# Patient Record
Sex: FEMALE | Race: BLACK OR AFRICAN AMERICAN | NOT HISPANIC OR LATINO | Employment: UNEMPLOYED | URBAN - METROPOLITAN AREA
[De-identification: names, ages, dates, MRNs, and addresses within clinical notes are randomized per-mention and may not be internally consistent; named-entity substitution may affect disease eponyms.]

---

## 2017-07-12 ENCOUNTER — HOSPITAL ENCOUNTER (INPATIENT)
Facility: HOSPITAL | Age: 47
LOS: 1 days | Discharge: HOME | DRG: 812 | End: 2017-07-13
Attending: OBSTETRICS & GYNECOLOGY | Admitting: OBSTETRICS & GYNECOLOGY
Payer: MEDICAID

## 2017-07-12 DIAGNOSIS — D25.9 UTERINE LEIOMYOMA, UNSPECIFIED LOCATION: ICD-10-CM

## 2017-07-12 DIAGNOSIS — N92.4 PERIMENOPAUSAL MENORRHAGIA: ICD-10-CM

## 2017-07-12 DIAGNOSIS — N93.8 DYSFUNCTIONAL UTERINE BLEEDING: ICD-10-CM

## 2017-07-12 DIAGNOSIS — R10.2 ACUTE PELVIC PAIN, FEMALE: ICD-10-CM

## 2017-07-12 DIAGNOSIS — D64.9 SYMPTOMATIC ANEMIA: Primary | ICD-10-CM

## 2017-07-12 PROBLEM — D25.1 INTRAMURAL LEIOMYOMA OF UTERUS: Status: ACTIVE | Noted: 2017-07-12

## 2017-07-12 LAB
ABO + RH BLD: NORMAL
ANION GAP SERPL CALC-SCNC: 9 MMOL/L
ANISOCYTOSIS BLD QL SMEAR: SLIGHT
B-HCG UR QL: NEGATIVE
BASOPHILS # BLD AUTO: 0.07 K/UL
BASOPHILS NFR BLD: 1 %
BLD GP AB SCN CELLS X3 SERPL QL: NORMAL
BLD PROD TYP BPU: NORMAL
BLD PROD TYP BPU: NORMAL
BLOOD UNIT EXPIRATION DATE: NORMAL
BLOOD UNIT EXPIRATION DATE: NORMAL
BLOOD UNIT TYPE CODE: 5100
BLOOD UNIT TYPE CODE: 5100
BLOOD UNIT TYPE: NORMAL
BLOOD UNIT TYPE: NORMAL
BUN SERPL-MCNC: 5 MG/DL
CALCIUM SERPL-MCNC: 8.7 MG/DL
CHLORIDE SERPL-SCNC: 109 MMOL/L
CO2 SERPL-SCNC: 22 MMOL/L
CODING SYSTEM: NORMAL
CODING SYSTEM: NORMAL
CREAT SERPL-MCNC: 0.8 MG/DL
DACRYOCYTES BLD QL SMEAR: ABNORMAL
DIFFERENTIAL METHOD: ABNORMAL
DISPENSE STATUS: NORMAL
DISPENSE STATUS: NORMAL
EOSINOPHIL # BLD AUTO: 0.4 K/UL
EOSINOPHIL NFR BLD: 5.7 %
ERYTHROCYTE [DISTWIDTH] IN BLOOD BY AUTOMATED COUNT: 20.6 %
EST. GFR  (AFRICAN AMERICAN): >60 ML/MIN/1.73 M^2
EST. GFR  (NON AFRICAN AMERICAN): >60 ML/MIN/1.73 M^2
GLUCOSE SERPL-MCNC: 89 MG/DL
HCT VFR BLD AUTO: 26.8 %
HGB BLD-MCNC: 7.1 G/DL
HYPOCHROMIA BLD QL SMEAR: ABNORMAL
LYMPHOCYTES # BLD AUTO: 2.1 K/UL
LYMPHOCYTES NFR BLD: 29.4 %
MCH RBC QN AUTO: 16.1 PG
MCHC RBC AUTO-ENTMCNC: 26.5 %
MCV RBC AUTO: 61 FL
MONOCYTES # BLD AUTO: 0.7 K/UL
MONOCYTES NFR BLD: 10.1 %
NEUTROPHILS # BLD AUTO: 3.8 K/UL
NEUTROPHILS NFR BLD: 54.1 %
NUM UNITS TRANS PACKED RBC: NORMAL
NUM UNITS TRANS PACKED RBC: NORMAL
OVALOCYTES BLD QL SMEAR: ABNORMAL
PLATELET # BLD AUTO: 320 K/UL
PLATELET BLD QL SMEAR: ABNORMAL
PMV BLD AUTO: 9.1 FL
POIKILOCYTOSIS BLD QL SMEAR: SLIGHT
POLYCHROMASIA BLD QL SMEAR: ABNORMAL
POTASSIUM SERPL-SCNC: 4 MMOL/L
RBC # BLD AUTO: 4.41 M/UL
SODIUM SERPL-SCNC: 140 MMOL/L
SPHEROCYTES BLD QL SMEAR: ABNORMAL
STOMATOCYTES BLD QL SMEAR: PRESENT
TARGETS BLD QL SMEAR: ABNORMAL
WBC # BLD AUTO: 7.03 K/UL

## 2017-07-12 PROCEDURE — 11000001 HC ACUTE MED/SURG PRIVATE ROOM

## 2017-07-12 PROCEDURE — 25000003 PHARM REV CODE 250: Performed by: PHYSICIAN ASSISTANT

## 2017-07-12 PROCEDURE — 86920 COMPATIBILITY TEST SPIN: CPT

## 2017-07-12 PROCEDURE — 25000003 PHARM REV CODE 250: Performed by: OBSTETRICS & GYNECOLOGY

## 2017-07-12 PROCEDURE — 86901 BLOOD TYPING SEROLOGIC RH(D): CPT

## 2017-07-12 PROCEDURE — 96361 HYDRATE IV INFUSION ADD-ON: CPT

## 2017-07-12 PROCEDURE — 81025 URINE PREGNANCY TEST: CPT

## 2017-07-12 PROCEDURE — 63600175 PHARM REV CODE 636 W HCPCS: Performed by: PHYSICIAN ASSISTANT

## 2017-07-12 PROCEDURE — 99285 EMERGENCY DEPT VISIT HI MDM: CPT | Mod: 25

## 2017-07-12 PROCEDURE — 86900 BLOOD TYPING SEROLOGIC ABO: CPT

## 2017-07-12 PROCEDURE — 80048 BASIC METABOLIC PNL TOTAL CA: CPT

## 2017-07-12 PROCEDURE — 36430 TRANSFUSION BLD/BLD COMPNT: CPT

## 2017-07-12 PROCEDURE — 96375 TX/PRO/DX INJ NEW DRUG ADDON: CPT

## 2017-07-12 PROCEDURE — 36415 COLL VENOUS BLD VENIPUNCTURE: CPT

## 2017-07-12 PROCEDURE — 63600175 PHARM REV CODE 636 W HCPCS: Performed by: OBSTETRICS & GYNECOLOGY

## 2017-07-12 PROCEDURE — P9016 RBC LEUKOCYTES REDUCED: HCPCS

## 2017-07-12 PROCEDURE — 99222 1ST HOSP IP/OBS MODERATE 55: CPT | Mod: ,,, | Performed by: OBSTETRICS & GYNECOLOGY

## 2017-07-12 PROCEDURE — 93010 ELECTROCARDIOGRAM REPORT: CPT | Mod: ,,, | Performed by: INTERNAL MEDICINE

## 2017-07-12 PROCEDURE — 96374 THER/PROPH/DIAG INJ IV PUSH: CPT

## 2017-07-12 PROCEDURE — 85025 COMPLETE CBC W/AUTO DIFF WBC: CPT

## 2017-07-12 RX ORDER — HYDROCODONE BITARTRATE AND ACETAMINOPHEN 500; 5 MG/1; MG/1
TABLET ORAL
Status: DISCONTINUED | OUTPATIENT
Start: 2017-07-12 | End: 2017-07-13 | Stop reason: HOSPADM

## 2017-07-12 RX ORDER — DIPHENHYDRAMINE HYDROCHLORIDE 50 MG/ML
25 INJECTION INTRAMUSCULAR; INTRAVENOUS ONCE
Status: COMPLETED | OUTPATIENT
Start: 2017-07-12 | End: 2017-07-12

## 2017-07-12 RX ORDER — MEPERIDINE HYDROCHLORIDE 50 MG/ML
12.5 INJECTION INTRAMUSCULAR; INTRAVENOUS; SUBCUTANEOUS
Status: COMPLETED | OUTPATIENT
Start: 2017-07-12 | End: 2017-07-12

## 2017-07-12 RX ORDER — HYDROCODONE BITARTRATE AND ACETAMINOPHEN 10; 325 MG/1; MG/1
1 TABLET ORAL EVERY 4 HOURS PRN
Status: DISCONTINUED | OUTPATIENT
Start: 2017-07-12 | End: 2017-07-13 | Stop reason: HOSPADM

## 2017-07-12 RX ORDER — ONDANSETRON 2 MG/ML
4 INJECTION INTRAMUSCULAR; INTRAVENOUS EVERY 12 HOURS PRN
Status: DISCONTINUED | OUTPATIENT
Start: 2017-07-12 | End: 2017-07-13 | Stop reason: HOSPADM

## 2017-07-12 RX ORDER — DIPHENHYDRAMINE HYDROCHLORIDE 50 MG/ML
25 INJECTION INTRAMUSCULAR; INTRAVENOUS
Status: COMPLETED | OUTPATIENT
Start: 2017-07-12 | End: 2017-07-12

## 2017-07-12 RX ORDER — NORETHINDRONE 5 MG/1
10 TABLET ORAL DAILY
Status: DISCONTINUED | OUTPATIENT
Start: 2017-07-12 | End: 2017-07-13 | Stop reason: HOSPADM

## 2017-07-12 RX ADMIN — DIPHENHYDRAMINE HYDROCHLORIDE 25 MG: 50 INJECTION, SOLUTION INTRAMUSCULAR; INTRAVENOUS at 05:07

## 2017-07-12 RX ADMIN — HYDROCODONE BITARTRATE AND ACETAMINOPHEN 1 TABLET: 10; 325 TABLET ORAL at 07:07

## 2017-07-12 RX ADMIN — NORETHINDRONE ACETATE 10 MG: 5 TABLET ORAL at 07:07

## 2017-07-12 RX ADMIN — DIPHENHYDRAMINE HYDROCHLORIDE 25 MG: 50 INJECTION, SOLUTION INTRAMUSCULAR; INTRAVENOUS at 09:07

## 2017-07-12 RX ADMIN — SODIUM CHLORIDE 1000 ML: 0.9 INJECTION, SOLUTION INTRAVENOUS at 12:07

## 2017-07-12 RX ADMIN — MEPERIDINE HYDROCHLORIDE 12.5 MG: 50 INJECTION INTRAMUSCULAR; INTRAVENOUS; SUBCUTANEOUS at 03:07

## 2017-07-12 NOTE — HPI
Pt reports several years of irregular menses.  Feels every other month she has 2 menses a month, flow at least 7 days; reports use of super pad; has to double up and change q 1-2 hrs.  Presented to er this am because she was light headed and had pelvic pain.  hgb 7.1; pelvic sono--ut 11.8x4.6x5.3; with 2 cm fundal fibroid; lining 1.3mm wnl; r/l ov wnl

## 2017-07-12 NOTE — ED PROVIDER NOTES
Encounter Date: 7/12/2017    SCRIBE #1 NOTE: I, Jolly Araceli, am scribing for, and in the presence of, SAKINA Mae.       History     Chief Complaint   Patient presents with    Female  Problem     patient having vaginal bleeding and she feeling dizziness     The patient presents to the ER for an emergent evaluation due to acute pelvic pain, heavy vaginal bleeding, and lightheadedness. She states that her symptoms began yesterday. She states that the degree is moderate to severe. She states that the course is waxing and waning. She states that she has used 3 pads so far today, no tampons. She states that she has been having 2 periods each month for several months, and the bleeding has been lasting up to 10 days. She denies any additional symptoms.                 Review of patient's allergies indicates:   Allergen Reactions    Morphine Itching     Past Medical History:   Diagnosis Date    Anemia     Heart murmur     History of blood transfusion     Mitral valve prolapse     Ovarian torsion     Rupture of ovary or fallopian tube      Past Surgical History:   Procedure Laterality Date    APPENDECTOMY      Left ovary and follopian tube removal        No family history on file.  Social History   Substance Use Topics    Smoking status: Current Every Day Smoker     Packs/day: 0.50     Types: Cigarettes    Smokeless tobacco: Never Used    Alcohol use No     Review of Systems   Constitutional: Negative for activity change, appetite change, chills, diaphoresis and fever.   Eyes: Negative for visual disturbance.   Respiratory: Negative for cough, chest tightness and shortness of breath.    Cardiovascular: Negative for chest pain and palpitations.   Gastrointestinal: Negative for abdominal distention, abdominal pain, blood in stool, diarrhea, nausea and vomiting.   Genitourinary: Positive for menstrual problem, pelvic pain and vaginal bleeding. Negative for dysuria, flank pain and frequency.    Musculoskeletal: Negative for gait problem.   Skin: Negative for color change.   Neurological: Positive for dizziness and light-headedness. Negative for seizures, syncope, facial asymmetry, speech difficulty, weakness, numbness and headaches.   Hematological: Does not bruise/bleed easily.   Psychiatric/Behavioral: Negative for confusion.       Physical Exam     Initial Vitals [07/12/17 1141]   BP Pulse Resp Temp SpO2   121/74 64 20 98.2 °F (36.8 °C) 100 %      MAP       89.67         Physical Exam    Nursing note and vitals reviewed.  Constitutional: She appears well-developed and well-nourished. She is not diaphoretic. She appears distressed.   HENT:   Head: Normocephalic.   Mouth/Throat: Oropharynx is clear and moist.   Eyes: Conjunctivae are normal.   Cardiovascular: Normal rate, regular rhythm and intact distal pulses.   Murmur heard.  Pulmonary/Chest: Breath sounds normal. No respiratory distress.   Abdominal: Soft. She exhibits no distension. There is no rebound and no guarding.   Mild to moderate diffuse tenderness to palpation of lower abdomen/pelvis.   Musculoskeletal: Normal range of motion.   Neurological: She is alert and oriented to person, place, and time. She has normal strength. No cranial nerve deficit or sensory deficit.   Skin: Skin is warm and dry. No rash noted.   Psychiatric: She has a normal mood and affect. Her behavior is normal.         ED Course   Procedures  Labs Reviewed   CBC W/ AUTO DIFFERENTIAL - Abnormal; Notable for the following:        Result Value    Hemoglobin 7.1 (*)     Hematocrit 26.8 (*)     MCV 61 (*)     MCH 16.1 (*)     MCHC 26.5 (*)     RDW 20.6 (*)     MPV 9.1 (*)     All other components within normal limits   BASIC METABOLIC PANEL - Abnormal; Notable for the following:     CO2 22 (*)     BUN, Bld 5 (*)     All other components within normal limits   PREGNANCY TEST, URINE RAPID   BASIC METABOLIC PANEL   TYPE & SCREEN   PREPARE RBC SOFT     Results for orders placed  or performed during the hospital encounter of 07/12/17   Pregnancy, urine rapid   Result Value Ref Range    Preg Test, Ur Negative    CBC auto differential   Result Value Ref Range    WBC 7.03 3.90 - 12.70 K/uL    RBC 4.41 4.00 - 5.40 M/uL    Hemoglobin 7.1 (L) 12.0 - 16.0 g/dL    Hematocrit 26.8 (L) 37.0 - 48.5 %    MCV 61 (L) 82 - 98 fL    MCH 16.1 (L) 27.0 - 31.0 pg    MCHC 26.5 (L) 32.0 - 36.0 %    RDW 20.6 (H) 11.5 - 14.5 %    Platelets 320 150 - 350 K/uL    MPV 9.1 (L) 9.2 - 12.9 fL    Gran # 3.8 1.8 - 7.7 K/uL    Lymph # 2.1 1.0 - 4.8 K/uL    Mono # 0.7 0.3 - 1.0 K/uL    Eos # 0.4 0.0 - 0.5 K/uL    Baso # 0.07 0.00 - 0.20 K/uL    Gran% 54.1 38.0 - 73.0 %    Lymph% 29.4 18.0 - 48.0 %    Mono% 10.1 4.0 - 15.0 %    Eosinophil% 5.7 0.0 - 8.0 %    Basophil% 1.0 0.0 - 1.9 %    Platelet Estimate Appears normal     Aniso Slight     Poik Slight     Poly Occasional     Hypo Moderate     Ovalocytes Occasional     Target Cells Occasional     Tear Drop Cells Occasional     Stomatocytes Present     Spherocytes Occasional     Differential Method Automated    Basic metabolic panel   Result Value Ref Range    Sodium 140 136 - 145 mmol/L    Potassium 4.0 3.5 - 5.1 mmol/L    Chloride 109 95 - 110 mmol/L    CO2 22 (L) 23 - 29 mmol/L    Glucose 89 70 - 110 mg/dL    BUN, Bld 5 (L) 6 - 20 mg/dL    Creatinine 0.8 0.5 - 1.4 mg/dL    Calcium 8.7 8.7 - 10.5 mg/dL    Anion Gap 9 8 - 16 mmol/L    eGFR if African American >60 >60 mL/min/1.73 m^2    eGFR if non African American >60 >60 mL/min/1.73 m^2   Type & Screen   Result Value Ref Range    Group & Rh O POS     Indirect Laron NEG      Vitals:    07/12/17 1155 07/12/17 1200 07/12/17 1205 07/12/17 1344   BP: (!) 140/81 (!) 140/94 124/83 137/79   BP Location: Right arm Right arm Right arm    Patient Position: Lying Sitting Standing    BP Method: Automatic Automatic Automatic    Pulse: 66 77 93 (!) 58   Resp:    18   Temp:       TempSrc:       SpO2:    99%   Weight:       Height:          Imaging Results          US Pelvis Comp with Transvag NON-OB (xpd) (Final result)  Result time 07/12/17 15:53:52   Procedure changed from US Pelvis Complete Non OB     Final result by Jeff Stuart MD (07/12/17 15:53:52)                 Impression:     Fundal small uterine fibroid, but otherwise unremarkable pelvic ultrasound.      Electronically signed by: JEFF STUART MD  Date:     07/12/17  Time:    15:53              Narrative:    Exam: Complete Pelvic Ultrasound with Transvaginal non-OB    Clinical History:    Acute pelvic pain and heavy vaginal bleeding.    Findings:     The uterus is normal in size measuring 11.8 x 4.6 x 5.3 cm and has a normal echotexture with a normal 1.6 mm endometrial stripe.  There is a 2.2 cm fibroid at the fundus of the uterus.  The right ovary measures 2.6 x 1.6 x 2.1 cm and has a normal appearance.  The left ovary has been removed surgically. No free fluid identified. No solid adnexal lesions identified. Internal flow is demonstrated within the right ovary on color doppler.                              4:15 PM: Discussed case with Dr. Piper (OB/GYN). Dr. Piper agrees with current care and management of pt and accepts admission. She requests STAT blood transfusion of 2 units   Admitting Service: OB/GYN  Admitting Physician: Dr. Piper  Admit to: Med/Tele    4:15 PM: Re-evaluated pt. I have discussed test results, shared treatment plan, and the need for admission with patient and family at bedside. Pt and family express understanding at this time and agree with all information. All questions answered. Pt and family have no further questions or concerns at this time. Pt is ready for admit.              Medical Decision Making:   History:   Old Medical Records: I decided to obtain old medical records.  Initial Assessment:   Pt here due to acute pelvic pain, heavy abnormal periods, and lightheadedness.     Hx of ovarian torsion/fallopian rupture, and anemia requiring transfusion in the  past.   Differential Diagnosis:   Anemia, Dysfunctional uterine bleeding, Menorrhagia, Metrorrhagia, Dysmenorrhea, Ovarian cyst, Uterine fibroid, Ovarian torsion, Ectopic pregnancy, TOA, etc   Clinical Tests:   Lab Tests: Ordered and Reviewed  Radiological Study: Ordered and Reviewed  ED Management:  H & H compared to previous.   US shows fibroid   Discussed case in detail with on call OB/GYN, Dr Piper, who requests transfusing 2 units of blood now and admitting to her service.   Other:   I have discussed this case with another health care provider.            Scribe Attestation:   Scribe #1: I performed the above scribed service and the documentation accurately describes the services I performed. I attest to the accuracy of the note.    Attending Attestation:           Physician Attestation for Scribe:  Physician Attestation Statement for Scribe #1: I, SAKINA Mae, reviewed documentation, as scribed by Jolly Charles in my presence, and it is both accurate and complete.                 ED Course     Clinical Impression:   The primary encounter diagnosis was Symptomatic anemia. Diagnoses of Dysfunctional uterine bleeding, Acute pelvic pain, female, and Uterine leiomyoma, unspecified location were also pertinent to this visit.    Disposition:   Disposition: Admitted  Condition: Fair                        Rickie Campa PA-C  07/12/17 1623

## 2017-07-12 NOTE — ASSESSMENT & PLAN NOTE
Reviewed options--provera, depo provera, mirena iud, ablation; hysterectomy  Pt elects trial of provera for now  Agrees to f/u with me in 2 wks  Or may need [facility] due to insurance restraints

## 2017-07-12 NOTE — ED NOTES
Pt c/o generalized itching, SAKINA Saleem notified, verbal order received, continue blood transfusion.

## 2017-07-12 NOTE — SUBJECTIVE & OBJECTIVE
Pt reports she is feeling a little better; still bleeding; denies chest pain, shortness of breath    Obstetric History     No data available        Past Medical History:   Diagnosis Date    Anemia     Heart murmur     History of blood transfusion     Mitral valve prolapse     Ovarian torsion     Rupture of ovary or fallopian tube      Past Surgical History:   Procedure Laterality Date    APPENDECTOMY      Left ovary and follopian tube removal      c/section      (Not in a hospital admission)    Review of patient's allergies indicates:   Allergen Reactions    Morphine Itching        Family History     None        Social History Main Topics    Smoking status: Current Every Day Smoker     Packs/day: 0.50     Types: Cigarettes    Smokeless tobacco: Never Used    Alcohol use No    Drug use:      Types: Marijuana    Sexual activity: Not on file     Review of Systems   Constitutional: Negative for activity change, appetite change, chills, diaphoresis, fatigue, fever and unexpected weight change.   HENT: Negative for mouth sores and tinnitus.    Eyes: Negative for discharge and visual disturbance.   Respiratory: Negative for cough, shortness of breath and wheezing.    Cardiovascular: Negative for chest pain, palpitations and leg swelling.   Gastrointestinal: Negative for abdominal pain, bloating, blood in stool, constipation, diarrhea, nausea and vomiting.   Endocrine: Negative for diabetes, hair loss, hot flashes, hyperthyroidism and hypothyroidism.   Genitourinary: Positive for menorrhagia, menstrual problem and pelvic pain. Negative for decreased libido, dyspareunia, dysuria, flank pain, frequency, genital sores, hematuria, urgency, vaginal bleeding, vaginal discharge, vaginal pain, dysmenorrhea, urinary incontinence, postcoital bleeding, postmenopausal bleeding and vaginal odor.   Musculoskeletal: Negative for back pain and myalgias.   Skin:  Negative for rash, no acne and hair changes.   Neurological:  Positive for syncope. Negative for seizures, numbness and headaches.   Hematological: Negative for adenopathy. Does not bruise/bleed easily.   Psychiatric/Behavioral: Negative for depression and sleep disturbance. The patient is not nervous/anxious.    Breast: Negative for breast mass, breast pain, nipple discharge and skin changes     Objective:     Vital Signs (Most Recent):  Temp: 98.3 °F (36.8 °C) (07/12/17 1836)  Pulse: (!) 59 (07/12/17 1836)  Resp: 16 (07/12/17 1836)  BP: 126/73 (07/12/17 1836)  SpO2: 100 % (07/12/17 1836) Vital Signs (24h Range):  Temp:  [98.2 °F (36.8 °C)-98.7 °F (37.1 °C)] 98.3 °F (36.8 °C)  Pulse:  [58-93] 59  Resp:  [14-20] 16  SpO2:  [99 %-100 %] 100 %  BP: (113-140)/(49-94) 126/73     Weight: 72.6 kg (160 lb)  Body mass index is 28.34 kg/m².    No LMP recorded.    Physical Exam:   Constitutional: She appears well-developed.     Eyes: Conjunctivae and EOM are normal. Pupils are equal, round, and reactive to light.    Neck: Normal range of motion. Neck supple.     Pulmonary/Chest: Effort normal.        Abdominal: Soft.             Musculoskeletal: Normal range of motion.       Neurological: She is alert.    Skin: Skin is warm.    Psychiatric: She has a normal mood and affect.       Laboratory:  CBC:   Recent Labs  Lab 07/12/17  1247   WBC 7.03   RBC 4.41   HGB 7.1*   HCT 26.8*      MCV 61*   MCH 16.1*   MCHC 26.5*       Diagnostic Results:  US: Reviewed  ut 11.8x4.6x5.3, with 2.1 cm fundal fibroid, endometrial lining wnl

## 2017-07-12 NOTE — H&P
[facility] -   Obstetrics & Gynecology  History & Physical    Patient Name: Lani Hurst  MRN: [id number]  Admission Date: 7/12/2017  Primary Care Provider: No primary care provider on file.    Subjective:     Chief Complaint/Reason for Admission: anemia    History of Present Illness:  Pt reports several years of irregular menses.  Feels every other month she has 2 menses a month, flow at least 7 days; reports use of super pad; has to double up and change q 1-2 hrs.  Presented to er this am because she was light headed and had pelvic pain.  hgb 7.1; pelvic sono--ut 11.8x4.6x5.3; with 2 cm fundal fibroid; lining 1.3mm wnl; r/l ov wnl    Pt reports she is feeling a little better; still bleeding; denies chest pain, shortness of breath    Obstetric History     No data available        Past Medical History:   Diagnosis Date    Anemia     Heart murmur     History of blood transfusion     Mitral valve prolapse     Ovarian torsion     Rupture of ovary or fallopian tube      Past Surgical History:   Procedure Laterality Date    APPENDECTOMY      Left ovary and follopian tube removal      c/section      (Not in a hospital admission)    Review of patient's allergies indicates:   Allergen Reactions    Morphine Itching        Family History     None        Social History Main Topics    Smoking status: Current Every Day Smoker     Packs/day: 0.50     Types: Cigarettes    Smokeless tobacco: Never Used    Alcohol use No    Drug use:      Types: Marijuana    Sexual activity: Not on file     Review of Systems   Constitutional: Negative for activity change, appetite change, chills, diaphoresis, fatigue, fever and unexpected weight change.   HENT: Negative for mouth sores and tinnitus.    Eyes: Negative for discharge and visual disturbance.   Respiratory: Negative for cough, shortness of breath and wheezing.    Cardiovascular: Negative for chest pain, palpitations and leg swelling.   Gastrointestinal:  Negative for abdominal pain, bloating, blood in stool, constipation, diarrhea, nausea and vomiting.   Endocrine: Negative for diabetes, hair loss, hot flashes, hyperthyroidism and hypothyroidism.   Genitourinary: Positive for menorrhagia, menstrual problem and pelvic pain. Negative for decreased libido, dyspareunia, dysuria, flank pain, frequency, genital sores, hematuria, urgency, vaginal bleeding, vaginal discharge, vaginal pain, dysmenorrhea, urinary incontinence, postcoital bleeding, postmenopausal bleeding and vaginal odor.   Musculoskeletal: Negative for back pain and myalgias.   Skin:  Negative for rash, no acne and hair changes.   Neurological: Positive for syncope. Negative for seizures, numbness and headaches.   Hematological: Negative for adenopathy. Does not bruise/bleed easily.   Psychiatric/Behavioral: Negative for depression and sleep disturbance. The patient is not nervous/anxious.    Breast: Negative for breast mass, breast pain, nipple discharge and skin changes     Objective:     Vital Signs (Most Recent):  Temp: 98.3 °F (36.8 °C) (07/12/17 1836)  Pulse: (!) 59 (07/12/17 1836)  Resp: 16 (07/12/17 1836)  BP: 126/73 (07/12/17 1836)  SpO2: 100 % (07/12/17 1836) Vital Signs (24h Range):  Temp:  [98.2 °F (36.8 °C)-98.7 °F (37.1 °C)] 98.3 °F (36.8 °C)  Pulse:  [58-93] 59  Resp:  [14-20] 16  SpO2:  [99 %-100 %] 100 %  BP: (113-140)/(49-94) 126/73     Weight: 72.6 kg (160 lb)  Body mass index is 28.34 kg/m².    No LMP recorded.    Physical Exam:   Constitutional: She appears well-developed.     Eyes: Conjunctivae and EOM are normal. Pupils are equal, round, and reactive to light.    Neck: Normal range of motion. Neck supple.     Pulmonary/Chest: Effort normal.        Abdominal: Soft.             Musculoskeletal: Normal range of motion.       Neurological: She is alert.    Skin: Skin is warm.    Psychiatric: She has a normal mood and affect.       Laboratory:  CBC:   Recent Labs  Lab 07/12/17  1247   WBC  7.03   RBC 4.41   HGB 7.1*   HCT 26.8*      MCV 61*   MCH 16.1*   MCHC 26.5*       Diagnostic Results:  US: Reviewed  ut 11.8x4.6x5.3, with 2.1 cm fundal fibroid, endometrial lining wnl    Assessment/Plan:     Perimenopausal menorrhagia    Reviewed options--provera, depo provera, mirena iud, ablation; hysterectomy  Pt elects trial of provera for now  Agrees to f/u with me in 2 wks  Or may need [facility] with [facility] due to insurance restraints        Symptomatic anemia    Pt to receive 2 units prbc  Will d/c home with iron            Reta Piper MD  Obstetrics & Gynecology  [facility]

## 2017-07-13 VITALS
RESPIRATION RATE: 16 BRPM | HEART RATE: 69 BPM | WEIGHT: 160 LBS | OXYGEN SATURATION: 100 % | HEIGHT: 63 IN | SYSTOLIC BLOOD PRESSURE: 119 MMHG | TEMPERATURE: 98 F | BODY MASS INDEX: 28.35 KG/M2 | DIASTOLIC BLOOD PRESSURE: 62 MMHG

## 2017-07-13 LAB
HCT VFR BLD AUTO: 29 %
HGB BLD-MCNC: 8.5 G/DL

## 2017-07-13 PROCEDURE — 25000003 PHARM REV CODE 250: Performed by: OBSTETRICS & GYNECOLOGY

## 2017-07-13 PROCEDURE — 36415 COLL VENOUS BLD VENIPUNCTURE: CPT

## 2017-07-13 PROCEDURE — 99238 HOSP IP/OBS DSCHRG MGMT 30/<: CPT | Mod: ,,, | Performed by: OBSTETRICS & GYNECOLOGY

## 2017-07-13 PROCEDURE — 85014 HEMATOCRIT: CPT

## 2017-07-13 PROCEDURE — 63600175 PHARM REV CODE 636 W HCPCS: Performed by: PHYSICIAN ASSISTANT

## 2017-07-13 PROCEDURE — 25000003 PHARM REV CODE 250: Performed by: PHYSICIAN ASSISTANT

## 2017-07-13 PROCEDURE — 63600175 PHARM REV CODE 636 W HCPCS: Performed by: OBSTETRICS & GYNECOLOGY

## 2017-07-13 PROCEDURE — 85018 HEMOGLOBIN: CPT

## 2017-07-13 RX ORDER — DIPHENHYDRAMINE HYDROCHLORIDE 50 MG/ML
25 INJECTION INTRAMUSCULAR; INTRAVENOUS ONCE
Status: COMPLETED | OUTPATIENT
Start: 2017-07-13 | End: 2017-07-13

## 2017-07-13 RX ORDER — NORETHINDRONE 5 MG/1
10 TABLET ORAL DAILY
Qty: 30 TABLET | Refills: 3 | Status: ON HOLD | OUTPATIENT
Start: 2017-07-13 | End: 2018-07-11 | Stop reason: HOSPADM

## 2017-07-13 RX ADMIN — ONDANSETRON 4 MG: 2 INJECTION INTRAMUSCULAR; INTRAVENOUS at 02:07

## 2017-07-13 RX ADMIN — DIPHENHYDRAMINE HYDROCHLORIDE 25 MG: 50 INJECTION, SOLUTION INTRAMUSCULAR; INTRAVENOUS at 05:07

## 2017-07-13 RX ADMIN — NORETHINDRONE ACETATE 10 MG: 5 TABLET ORAL at 09:07

## 2017-07-13 RX ADMIN — HYDROCODONE BITARTRATE AND ACETAMINOPHEN 1 TABLET: 10; 325 TABLET ORAL at 05:07

## 2017-07-13 RX ADMIN — HYDROCODONE BITARTRATE AND ACETAMINOPHEN 1 TABLET: 10; 325 TABLET ORAL at 10:07

## 2017-07-13 NOTE — PLAN OF CARE
07/13/17 1533   Final Note   Assessment Type Final Discharge Note   Discharge Disposition Home

## 2017-07-13 NOTE — DISCHARGE SUMMARY
[facility] -   Obstetrics & Gynecology  Discharge Summary    Patient Name: Lani Hurst  MRN: [id number]  Admission Date: 7/12/2017  Hospital Length of Stay: 1 days  Discharge Date and Time:  07/13/2017 7:00 AM  Attending Physician: Reta Piper MD   Discharging Provider: Reta Piper MD  Primary Care Provider: No primary care provider on file.    HPI:  Pt reports several years of irregular menses.  Feels every other month she has 2 menses a month, flow at least 7 days; reports use of super pad; has to double up and change q 1-2 hrs.  Presented to er this am because she was light headed and had pelvic pain.  hgb 7.1; pelvic sono--ut 11.8x4.6x5.3; with 2 cm fundal fibroid; lining 1.3mm wnl; r/l ov wnl    Hospital Course:  7/12/17 transfused 2 units for hgb 7.1    * No surgery found *         Significant Diagnostic Studies: Labs:   CBC   Recent Labs  Lab 07/12/17  1247 07/13/17  0502   WBC 7.03  --    HGB 7.1* 8.5*   HCT 26.8* 29.0*     --         Pending Diagnostic Studies:     None        Final Active Diagnoses:    Diagnosis Date Noted POA    PRINCIPAL PROBLEM:  Symptomatic anemia [D64.9] 07/12/2017 Yes    Perimenopausal menorrhagia [N92.4] 07/12/2017 Yes    Intramural leiomyoma of uterus [D25.1] 07/12/2017 Unknown      Problems Resolved During this Admission:    Diagnosis Date Noted Date Resolved POA        Discharged Condition: good    Disposition: Home or Self Care    Follow Up:  Follow-up Information     Reta Piper MD In 2 weeks.    Specialty:  Obstetrics and Gynecology  Why:  ED f/u; [place] location  Contact information:  [address] [place] [id number] [phone]                 Patient Instructions:     Diet general     Call MD for:  temperature >100.4     Call MD for:  persistent nausea and vomiting or diarrhea     Call MD for:  severe uncontrolled pain     Call MD for:  redness, tenderness, or signs of infection (pain, swelling, redness, odor or green/yellow discharge  around incision site)     Call MD for:  difficulty breathing or increased cough     Call MD for:  persistent dizziness, light-headedness, or visual disturbances     Call MD for:   Order Comments: Bleeding more than 1 pad per hour     Activity as tolerated       Medications:  Reconciled Home Medications:   Current Discharge Medication List      START taking these medications    Details   norethindrone (AYGESTIN) 5 mg Tab Take 2 tablets (10 mg total) by mouth once daily.  Qty: 30 tablet, Refills: 3         STOP taking these medications       aspirin 81 MG Chew Comments:   Reason for Stopping:         clotrimazole (LOTRIMIN) 1 % cream Comments:   Reason for Stopping:               Reta Piper MD  Obstetrics & Gynecology  [facility]

## 2017-07-13 NOTE — PLAN OF CARE
Initial assessment completed.Transitional Care Folder, Discharge Planning Begins on Admission pamphlet, [facility] Delivery pamphlet, Advance Directive information given to patient along with the contact information Instructed patient or daughter to call with any questions or concerns.       07/13/17 0760   Discharge Assessment   Assessment Type Discharge Planning Assessment   Confirmed/corrected address and phone number on facesheet? Yes   Assessment information obtained from? Patient;Caregiver;Medical Record   Expected Length of Stay (days) (today)   Communicated expected length of stay with patient/caregiver yes   Type of Healthcare Directive Received Advance Directive;Living will  (declined information)   Prior to hospitilization cognitive status: Alert/Oriented   Prior to hospitalization functional status: Independent   Current cognitive status: Alert/Oriented   Current Functional Status: Independent   Arrived From home or self-care   Lives With child(anirudh), adult   Able to Return to Prior Arrangements yes   Is patient able to care for self after discharge? Yes   How many people do you have in your home that can help with your care after discharge? 1   Who are your caregiver(s) and their phone number(s)? Macario Livingston ( friend ) [phone]   Patient's perception of discharge disposition home or selfcare   Readmission Within The Last 30 Days no previous admission in last 30 days   Patient currently being followed by outpatient case management? No   Patient currently receives home health services? No   Does the patient currently use HME? No   Patient currently receives private duty nursing? No   Patient currently receives any other outside agency services? No   Equipment Currently Used at Home none   Do you have any problems affording any of your prescribed medications? No   Is the patient taking medications as prescribed? yes   Do you have any financial concerns preventing you from receiving the  healthcare you need? No   Does the patient have transportation to healthcare appointments? Yes   Transportation Available car;family or friend will provide   On Dialysis? No   Does the patient receive services at the [facility]mad[facility] Clinic? No   Are there any open cases? No   Discharge Plan A Home;Home with family   Patient/Family In Agreement With Plan yes

## 2017-07-13 NOTE — SUBJECTIVE & OBJECTIVE
Interval History:   Hd #1  Still reports occasional itching; feels bleeding is lighter; denies being short of breath or chest pain; denies dizziness; no problems void    Scheduled Meds:   norethindrone  10 mg Oral Daily     Continuous Infusions:   PRN Meds:sodium chloride, hydrocodone-acetaminophen 10-325mg, ondansetron    Review of patient's allergies indicates:   Allergen Reactions    Morphine Itching       Objective:     Vital Signs (Most Recent):  Temp: 98 °F (36.7 °C) (07/13/17 0318)  Pulse: (!) 56 (07/13/17 0318)  Resp: 16 (07/13/17 0318)  BP: (!) 105/57 (07/13/17 0318)  SpO2: 100 % (07/13/17 0318) Vital Signs (24h Range):  Temp:  [97.8 °F (36.6 °C)-98.8 °F (37.1 °C)] 98 °F (36.7 °C)  Pulse:  [56-93] 56  Resp:  [14-20] 16  SpO2:  [99 %-100 %] 100 %  BP: (104-140)/(46-94) 105/57     Weight: 72.6 kg (160 lb)  Body mass index is 28.34 kg/m².  No LMP recorded.    I&O (Last 24H):      Intake/Output Summary (Last 24 hours) at 07/13/17 0652  Last data filed at 07/13/17 0500        Laboratory:  I have personallly reviewed all pertinent lab results from the last 24 hours.    Diagnostic Results:  US: Reviewed    Physical Exam:   Constitutional: She appears well-developed.     Eyes: Conjunctivae and EOM are normal. Pupils are equal, round, and reactive to light.    Neck: Normal range of motion. Neck supple.     Pulmonary/Chest: Effort normal.        Abdominal: Soft.             Musculoskeletal: Normal range of motion.       Neurological: She is alert.    Skin: Skin is warm.    Psychiatric: She has a normal mood and affect.

## 2017-07-13 NOTE — PROGRESS NOTES
[facility]  Obstetrics & Gynecology  Progress Note    Patient Name: Lani Hurst  MRN: [id number]  Admission Date: 7/12/2017  Primary Care Provider: No primary care provider on file.  Principal Problem: Symptomatic anemia    Subjective:     HPI:  Pt reports several years of irregular menses.  Feels every other month she has 2 menses a month, flow at least 7 days; reports use of super pad; has to double up and change q 1-2 hrs.  Presented to er this am because she was light headed and had pelvic pain.  hgb 7.1; pelvic sono--ut 11.8x4.6x5.3; with 2 cm fundal fibroid; lining 1.3mm wnl; r/l ov wnl    Interval History:   Hd #1  Still reports occasional itching; feels bleeding is lighter; denies being short of breath or chest pain; denies dizziness; no problems void    Scheduled Meds:   norethindrone  10 mg Oral Daily     Continuous Infusions:   PRN Meds:sodium chloride, hydrocodone-acetaminophen 10-325mg, ondansetron    Review of patient's allergies indicates:   Allergen Reactions    Morphine Itching       Objective:     Vital Signs (Most Recent):  Temp: 98 °F (36.7 °C) (07/13/17 0318)  Pulse: (!) 56 (07/13/17 0318)  Resp: 16 (07/13/17 0318)  BP: (!) 105/57 (07/13/17 0318)  SpO2: 100 % (07/13/17 0318) Vital Signs (24h Range):  Temp:  [97.8 °F (36.6 °C)-98.8 °F (37.1 °C)] 98 °F (36.7 °C)  Pulse:  [56-93] 56  Resp:  [14-20] 16  SpO2:  [99 %-100 %] 100 %  BP: (104-140)/(46-94) 105/57     Weight: 72.6 kg (160 lb)  Body mass index is 28.34 kg/m².  No LMP recorded.    I&O (Last 24H):      Intake/Output Summary (Last 24 hours) at 07/13/17 0664  Last data filed at 07/13/17 0500        Laboratory:  I have personallly reviewed all pertinent lab results from the last 24 hours.    Diagnostic Results:  US: Reviewed    Physical Exam:   Constitutional: She appears well-developed.     Eyes: Conjunctivae and EOM are normal. Pupils are equal, round, and reactive to light.    Neck: Normal range of motion. Neck supple.      Pulmonary/Chest: Effort normal.        Abdominal: Soft.             Musculoskeletal: Normal range of motion.       Neurological: She is alert.    Skin: Skin is warm.    Psychiatric: She has a normal mood and affect.       Assessment/Plan:     Perimenopausal menorrhagia    Reviewed options--provera, depo provera, mirena iud, ablation; hysterectomy  Pt elects trial of provera for now  Agrees to f/u with me in 2 wks  Or [facility] due to [facility] restraints        * Symptomatic anemia    Pt to receive 2 units prbc  Will d/c home with iron  hgb improved from 7.1 to 8.5          Reta Piper MD  Obstetrics & Gynecology  [facility]

## 2017-07-13 NOTE — PLAN OF CARE
Problem: Patient Care Overview  Goal: Plan of Care Review  Outcome: Outcome(s) achieved Date Met: 07/13/17  Pt being discharged today. Discharge instructions given pt verbalized understanding. IV removed cath tip intact. 12 hr chart check complete.

## 2017-07-13 NOTE — PLAN OF CARE
Problem: Patient Care Overview  Goal: Plan of Care Review  Outcome: Ongoing (interventions implemented as appropriate)  POC reviewed with patient. Indications for medications and possible side effects explained. Pt verbalized understanding. Pt received 2 units PRBC's without any adverse reactions noted. Pt did c/o moderate itching, however, pt stated it started before receiving blood. Skin intact; dry, flaky, but no rash noted. Benadryl administered per order.  Pt c/o mild nausea and indigestion; gave Zofran per order. VS stable. Will continue to monitor.

## 2018-02-07 ENCOUNTER — TELEPHONE (OUTPATIENT)
Dept: SMOKING CESSATION | Facility: CLINIC | Age: 48
End: 2018-02-07

## 2018-02-07 NOTE — TELEPHONE ENCOUNTER
Attempt to contact patient to introduce the Smoking Cessation Program. Patient states that she is not interested.

## 2018-07-10 ENCOUNTER — HOSPITAL ENCOUNTER (OUTPATIENT)
Facility: HOSPITAL | Age: 48
Discharge: HOME | End: 2018-07-11
Attending: EMERGENCY MEDICINE | Admitting: OBSTETRICS & GYNECOLOGY
Payer: MEDICAID

## 2018-07-10 DIAGNOSIS — D64.9 SYMPTOMATIC ANEMIA: Primary | ICD-10-CM

## 2018-07-10 DIAGNOSIS — D64.9 ANEMIA, UNSPECIFIED TYPE: ICD-10-CM

## 2018-07-10 DIAGNOSIS — N93.8 DYSFUNCTIONAL UTERINE BLEEDING: ICD-10-CM

## 2018-07-10 LAB
ABO + RH BLD: NORMAL
ALBUMIN SERPL BCP-MCNC: 3.7 G/DL
ALP SERPL-CCNC: 71 U/L
ALT SERPL W/O P-5'-P-CCNC: 11 U/L
ANION GAP SERPL CALC-SCNC: 7 MMOL/L
ANISOCYTOSIS BLD QL SMEAR: SLIGHT
AST SERPL-CCNC: 13 U/L
B-HCG UR QL: NEGATIVE
BACTERIA #/AREA URNS HPF: ABNORMAL /HPF
BASOPHILS # BLD AUTO: 0.06 K/UL
BASOPHILS NFR BLD: 0.5 %
BILIRUB SERPL-MCNC: 0.3 MG/DL
BILIRUB UR QL STRIP: NEGATIVE
BLD GP AB SCN CELLS X3 SERPL QL: NORMAL
BUN SERPL-MCNC: 6 MG/DL
BURR CELLS BLD QL SMEAR: ABNORMAL
CALCIUM SERPL-MCNC: 8.7 MG/DL
CHLORIDE SERPL-SCNC: 107 MMOL/L
CLARITY UR: CLEAR
CO2 SERPL-SCNC: 26 MMOL/L
COLOR UR: YELLOW
CREAT SERPL-MCNC: 0.8 MG/DL
CTP QC/QA: YES
DACRYOCYTES BLD QL SMEAR: ABNORMAL
DIFFERENTIAL METHOD: ABNORMAL
EOSINOPHIL # BLD AUTO: 0.4 K/UL
EOSINOPHIL NFR BLD: 3.1 %
ERYTHROCYTE [DISTWIDTH] IN BLOOD BY AUTOMATED COUNT: 21 %
EST. GFR  (AFRICAN AMERICAN): >60 ML/MIN/1.73 M^2
EST. GFR  (NON AFRICAN AMERICAN): >60 ML/MIN/1.73 M^2
GLUCOSE SERPL-MCNC: 95 MG/DL
GLUCOSE UR QL STRIP: NEGATIVE
HCT VFR BLD AUTO: 23.3 %
HGB BLD-MCNC: 6.2 G/DL
HGB UR QL STRIP: ABNORMAL
HYPOCHROMIA BLD QL SMEAR: ABNORMAL
KETONES UR QL STRIP: NEGATIVE
LEUKOCYTE ESTERASE UR QL STRIP: NEGATIVE
LYMPHOCYTES # BLD AUTO: 2.2 K/UL
LYMPHOCYTES NFR BLD: 20 %
MCH RBC QN AUTO: 16.2 PG
MCHC RBC AUTO-ENTMCNC: 26.6 G/DL
MCV RBC AUTO: 61 FL
MICROSCOPIC COMMENT: ABNORMAL
MONOCYTES # BLD AUTO: 0.9 K/UL
MONOCYTES NFR BLD: 8.3 %
NEUTROPHILS # BLD AUTO: 7.6 K/UL
NEUTROPHILS NFR BLD: 68.1 %
NITRITE UR QL STRIP: NEGATIVE
OVALOCYTES BLD QL SMEAR: ABNORMAL
PH UR STRIP: 7 [PH] (ref 5–8)
PLATELET # BLD AUTO: 378 K/UL
PLATELET BLD QL SMEAR: ABNORMAL
PMV BLD AUTO: 9.7 FL
POIKILOCYTOSIS BLD QL SMEAR: ABNORMAL
POTASSIUM SERPL-SCNC: 3.1 MMOL/L
PROT SERPL-MCNC: 7.8 G/DL
PROT UR QL STRIP: NEGATIVE
RBC # BLD AUTO: 3.82 M/UL
RBC #/AREA URNS HPF: 90 /HPF (ref 0–4)
SODIUM SERPL-SCNC: 140 MMOL/L
SP GR UR STRIP: 1.02 (ref 1–1.03)
SQUAMOUS #/AREA URNS HPF: 4 /HPF
TARGETS BLD QL SMEAR: ABNORMAL
URN SPEC COLLECT METH UR: ABNORMAL
UROBILINOGEN UR STRIP-ACNC: 1 EU/DL
WBC # BLD AUTO: 11.18 K/UL

## 2018-07-10 PROCEDURE — 80053 COMPREHEN METABOLIC PANEL: CPT

## 2018-07-10 PROCEDURE — G0378 HOSPITAL OBSERVATION PER HR: HCPCS

## 2018-07-10 PROCEDURE — 86850 RBC ANTIBODY SCREEN: CPT

## 2018-07-10 PROCEDURE — P9016 RBC LEUKOCYTES REDUCED: HCPCS

## 2018-07-10 PROCEDURE — 81025 URINE PREGNANCY TEST: CPT | Performed by: EMERGENCY MEDICINE

## 2018-07-10 PROCEDURE — 86920 COMPATIBILITY TEST SPIN: CPT

## 2018-07-10 PROCEDURE — 81000 URINALYSIS NONAUTO W/SCOPE: CPT

## 2018-07-10 PROCEDURE — 99284 EMERGENCY DEPT VISIT MOD MDM: CPT

## 2018-07-10 PROCEDURE — 85025 COMPLETE CBC W/AUTO DIFF WBC: CPT

## 2018-07-10 RX ORDER — HYDROCODONE BITARTRATE AND ACETAMINOPHEN 500; 5 MG/1; MG/1
TABLET ORAL
Status: DISCONTINUED | OUTPATIENT
Start: 2018-07-10 | End: 2018-07-11 | Stop reason: HOSPADM

## 2018-07-11 ENCOUNTER — TELEPHONE (OUTPATIENT)
Dept: OBSTETRICS AND GYNECOLOGY | Facility: CLINIC | Age: 48
End: 2018-07-11

## 2018-07-11 VITALS
DIASTOLIC BLOOD PRESSURE: 71 MMHG | WEIGHT: 180 LBS | TEMPERATURE: 98 F | BODY MASS INDEX: 31.89 KG/M2 | OXYGEN SATURATION: 100 % | RESPIRATION RATE: 17 BRPM | HEIGHT: 63 IN | HEART RATE: 67 BPM | SYSTOLIC BLOOD PRESSURE: 136 MMHG

## 2018-07-11 DIAGNOSIS — D25.9 UTERINE LEIOMYOMA, UNSPECIFIED LOCATION: Primary | ICD-10-CM

## 2018-07-11 LAB
ANISOCYTOSIS BLD QL SMEAR: SLIGHT
BASOPHILS # BLD AUTO: 0.06 K/UL
BASOPHILS NFR BLD: 0.5 %
BLD PROD TYP BPU: NORMAL
BLD PROD TYP BPU: NORMAL
BLOOD UNIT EXPIRATION DATE: NORMAL
BLOOD UNIT EXPIRATION DATE: NORMAL
BLOOD UNIT TYPE CODE: 5100
BLOOD UNIT TYPE CODE: 5100
BLOOD UNIT TYPE: NORMAL
BLOOD UNIT TYPE: NORMAL
BURR CELLS BLD QL SMEAR: ABNORMAL
CODING SYSTEM: NORMAL
CODING SYSTEM: NORMAL
DACRYOCYTES BLD QL SMEAR: ABNORMAL
DIFFERENTIAL METHOD: ABNORMAL
DISPENSE STATUS: NORMAL
DISPENSE STATUS: NORMAL
EOSINOPHIL # BLD AUTO: 0.4 K/UL
EOSINOPHIL NFR BLD: 3 %
ERYTHROCYTE [DISTWIDTH] IN BLOOD BY AUTOMATED COUNT: 23.4 %
HCT VFR BLD AUTO: 29.3 %
HGB BLD-MCNC: 8.6 G/DL
HYPOCHROMIA BLD QL SMEAR: ABNORMAL
LYMPHOCYTES # BLD AUTO: 2.8 K/UL
LYMPHOCYTES NFR BLD: 22.8 %
MCH RBC QN AUTO: 19.2 PG
MCHC RBC AUTO-ENTMCNC: 29.4 G/DL
MCV RBC AUTO: 65 FL
MONOCYTES # BLD AUTO: 1.1 K/UL
MONOCYTES NFR BLD: 8.7 %
NEUTROPHILS # BLD AUTO: 7.9 K/UL
NEUTROPHILS NFR BLD: 65 %
NUM UNITS TRANS PACKED RBC: NORMAL
OVALOCYTES BLD QL SMEAR: ABNORMAL
PLATELET # BLD AUTO: 398 K/UL
PLATELET BLD QL SMEAR: ABNORMAL
PMV BLD AUTO: 8.8 FL
POIKILOCYTOSIS BLD QL SMEAR: ABNORMAL
RBC # BLD AUTO: 4.49 M/UL
TARGETS BLD QL SMEAR: ABNORMAL
TRANS ERYTHROCYTES VOL PATIENT: NORMAL ML
WBC # BLD AUTO: 12.26 K/UL

## 2018-07-11 PROCEDURE — 25000003 PHARM REV CODE 250: Performed by: OBSTETRICS & GYNECOLOGY

## 2018-07-11 PROCEDURE — G0378 HOSPITAL OBSERVATION PER HR: HCPCS

## 2018-07-11 PROCEDURE — 36430 TRANSFUSION BLD/BLD COMPNT: CPT

## 2018-07-11 PROCEDURE — 36415 COLL VENOUS BLD VENIPUNCTURE: CPT

## 2018-07-11 PROCEDURE — 94761 N-INVAS EAR/PLS OXIMETRY MLT: CPT

## 2018-07-11 PROCEDURE — P9021 RED BLOOD CELLS UNIT: HCPCS

## 2018-07-11 RX ORDER — ACETAMINOPHEN 325 MG/1
650 TABLET ORAL ONCE
Status: COMPLETED | OUTPATIENT
Start: 2018-07-11 | End: 2018-07-11

## 2018-07-11 RX ORDER — IBUPROFEN 400 MG/1
800 TABLET ORAL EVERY 8 HOURS PRN
Status: DISCONTINUED | OUTPATIENT
Start: 2018-07-11 | End: 2018-07-11 | Stop reason: HOSPADM

## 2018-07-11 RX ORDER — DIPHENHYDRAMINE HCL 25 MG
CAPSULE ORAL
Status: DISCONTINUED
Start: 2018-07-11 | End: 2018-07-11 | Stop reason: HOSPADM

## 2018-07-11 RX ORDER — DIPHENHYDRAMINE HCL 25 MG
25 CAPSULE ORAL EVERY 4 HOURS PRN
Status: DISCONTINUED | OUTPATIENT
Start: 2018-07-11 | End: 2018-07-11 | Stop reason: HOSPADM

## 2018-07-11 RX ORDER — SYRING-NEEDL,DISP,INSUL,0.3 ML 29 G X1/2"
296 SYRINGE, EMPTY DISPOSABLE MISCELLANEOUS ONCE
Status: COMPLETED | OUTPATIENT
Start: 2018-07-11 | End: 2018-07-11

## 2018-07-11 RX ADMIN — DIPHENHYDRAMINE HYDROCHLORIDE 25 MG: 25 CAPSULE ORAL at 10:07

## 2018-07-11 RX ADMIN — DIPHENHYDRAMINE HYDROCHLORIDE 25 MG: 25 CAPSULE ORAL at 05:07

## 2018-07-11 RX ADMIN — DIPHENHYDRAMINE HYDROCHLORIDE 25 MG: 25 CAPSULE ORAL at 01:07

## 2018-07-11 RX ADMIN — Medication 296 ML: at 07:07

## 2018-07-11 RX ADMIN — IBUPROFEN 800 MG: 400 TABLET, FILM COATED ORAL at 01:07

## 2018-07-11 RX ADMIN — ACETAMINOPHEN 650 MG: 325 TABLET ORAL at 03:07

## 2018-07-11 NOTE — DISCHARGE INSTRUCTIONS
Discharge Instructions    To avoid constipation, eat fruits, vegetables, and whole grains.  Drink plenty of water.  Your doctor may suggest that you use a laxative or a mild stool softener.        CALL YOUR DOCTOR    *Chills or a fever of 100.4 or higher    *Bright red vaginal bleeding or foul smelling discharge    *Difficulty urinating or burning during urination    *Severe abdominal pain or bloating    *Heavy vaginal bleeding and/or passing large clots, soaking 1 pad/hr

## 2018-07-11 NOTE — TELEPHONE ENCOUNTER
----- Message from Rick Del Castillo sent at 7/11/2018  2:51 PM CDT -----  Contact: self/[phone]  Patient would like to be seen for a sooner appointment per her blood transfusion follow up instructions.      Please call and advise.

## 2018-07-11 NOTE — DISCHARGE SUMMARY
[facility]  Obstetrics & Gynecology  Discharge Summary    Patient Name: Lani Hurst  MRN: [id number]  Admission Date: 7/10/2018  Hospital Length of Stay: 0 days  Discharge Date:  2018   Attending Physician: Naty Carrero MD   Discharging Provider: Naty Carrero MD  Primary Care Provider: Primary Doctor No    Hospital Course: Pt is 47 y/o  who presented to ED with symptomatic anemia and H/H:6.2/23.3. Pt with chronic anemia associated with AUB-L. Pt was prescribed progestin last year by gyn in [place] but takes inconsistently. States since the end of  to now with off/on bleeding. Not heavy -changing at most 4 pads/day. +cramping. Pt with known fibroids. Declines medical management and ultimately desires hysterectomy. Pt was placed in observation for 2uPRBC blood transfusion with appropriate rise in H/H.. Pt this AM states she feels much better and bleeding is minimal only 1 pad (not saturated in 6-7hrs). States she will f/u in clinic with me for remaining work-up prior to surgery.    * No surgery found *     Significant Diagnostic Studies: Labs:   CBC   Recent Labs  Lab 07/10/18  2106   WBC 11.18   HGB 6.2*   HCT 23.3*   *     Lab Results   Component Value Date    WBC 12.26 2018    HGB 8.6 (L) 2018    HCT 29.3 (L) 2018    MCV 65 (L) 2018     (H) 2018         Pending Diagnostic Studies:     None        Final Active Diagnoses:    Diagnosis Date Noted POA    PRINCIPAL PROBLEM:  Symptomatic anemia [D64.9] 2017 Yes    Dysfunctional uterine bleeding [N93.8] 07/10/2018 Yes    Intramural leiomyoma of uterus [D25.1] 2017 Yes      Problems Resolved During this Admission:    Diagnosis Date Noted Date Resolved POA        Discharged Condition: good    Disposition: Home or Self Care    Follow Up:  Follow-up Information     Schedule an appointment as soon as possible for a visit with Naty Carrero MD.    Specialties:   Obstetrics, Obstetrics and Gynecology  Why:  Hospital F/U  Contact information:  [address]  [address]  [place] [address]  [phone]                 Patient Instructions:     Diet Adult Regular     Notify your health care provider if you experience any of the following:  temperature >100.4     Notify your health care provider if you experience any of the following:  persistent nausea and vomiting or diarrhea     Notify your health care provider if you experience any of the following:  severe uncontrolled pain     Notify your health care provider if you experience any of the following:  difficulty breathing or increased cough     Notify your health care provider if you experience any of the following:  severe persistent headache     Notify your health care provider if you experience any of the following:  worsening rash     Notify your health care provider if you experience any of the following:  persistent dizziness, light-headedness, or visual disturbances     Notify your health care provider if you experience any of the following:  increased confusion or weakness     Activity as tolerated       Medications:  Reconciled Home Medications:      Medication List      STOP taking these medications    norethindrone 5 mg Tab  Commonly known as:  ONI Carrero MD  Obstetrics & Gynecology  [facility]

## 2018-07-11 NOTE — ED TRIAGE NOTES
Patient states she has been having vaginal bleeding since her last menstrual cycle which started on June 10th. she states she has a lot of clots with the bleeding and is now having some nausea.

## 2018-07-11 NOTE — ED NOTES
"Patient states she feels like the bleeding is starting to get a little heavier now that she has changed position and sat up. " I feel like when im on my period and I get up and move and gravity starts to make everything work its way down."  "

## 2018-07-11 NOTE — PLAN OF CARE
Problem: Patient Care Overview  Goal: Plan of Care Review  Outcome: Ongoing (interventions implemented as appropriate)  0800 - vss, nad, denies pain at this time, tolerating regular diet, denies passing gas - mag citrate given @ 0715.  Poc: CBC @ 1000, pain management as needed, d/c home today.  Reviewed poc w/pt.  Pt verbalized understanding.    1115 - reviewed discharge instructions w/pt.  Pt verbalized understanding.  Vss, nad, pain well controlled w/motrin, tolerating regular diet, +BM after mag citrate, light vaginal bleeding upon discharge.  Pt to call clinic to schedule f/u appt.  Pt verbalized understanding.  Pt will call when for wheelchair when her transportation arrives.

## 2018-07-11 NOTE — TELEPHONE ENCOUNTER
Returned call, pt states she was advised to f/u with Dr. Carrero in clinic. Per your last note I scheduled pt for 7/17 AT 9am for U/S and 9:30am for EMBX and to discuss surgical options.

## 2018-07-11 NOTE — H&P
[facility]  Gynecology H&P    Patient Name: Lani Hurst  MRN: [id number]  Admission Date: 7/10/2018  Primary Care Provider: Primary Doctor No   Principal Problem: Symptomatic anemia    Subjective:     Chief Complaint/Reason for Admission: Symptomatic anemia    History of Present Illness: Pt is 49 y/o  who presented to ED with symptomatic anemia and H/H:6.2/23.3. Pt with chronic anemia associated with AUB-L. Pt was prescribed progestin last year by gyn in [place] but takes inconsistently. States end of  to now with off/on bleeding. Not heavy -changing at most 4 pads/day. +cramping. Pt with known fibroids. Declines medical management and ultimately desires hysterectomy. Pt was placed in observation for 2uPRBC blood transfusion. Pt this AM states she feels much better and bleeding is minimal only 1 pad (not saturated in 6-7hrs). Desires medication for constipation and states she will f/u in clinic with me for remaining work-up prior to surgery.      Menstrual history: Menarche: 11, Interval- Q month, Duration: 7 days, Flow- moderate to heavy    Past Medical History:   Diagnosis Date    Anemia     Heart murmur     History of blood transfusion     Mitral valve prolapse     Ovarian torsion      Past Surgical History:   Procedure Laterality Date    APPENDECTOMY      Left ovary and follopian tube removal       s/p ovarian torsion     Family History     None        Social History Main Topics    Smoking status: Current Every Day Smoker     Packs/day: 0.50     Types: Cigarettes    Smokeless tobacco: Never Used    Alcohol use No    Drug use: Yes     Types: Marijuana    Sexual activity: Not on file       PTA Medications   Medication Sig    norethindrone (AYGESTIN) 5 mg Tab Take 2 tablets (10 mg total) by mouth once daily.       Review of patient's allergies indicates:   Allergen Reactions    Morphine Itching       Review of Systems   Constitutional: Negative.    HENT: Negative.     Eyes: Negative.    Respiratory: Negative for cough and shortness of breath.    Cardiovascular: Negative for chest pain.   Gastrointestinal: Positive for constipation. Negative for blood in stool, diarrhea, nausea and vomiting.   Endocrine: Negative.    Musculoskeletal: Positive for back pain.   Skin:  Negative.   Neurological: Negative for syncope, numbness and headaches.   Psychiatric/Behavioral: Negative.       Objective:     Vital Signs (Most Recent):  Temp: 98.1 °F (36.7 °C) (07/11/18 0330)  Pulse: 65 (07/11/18 0330)  Resp: 17 (07/11/18 0330)  BP: 126/71 (07/11/18 0330)  SpO2: 100 % (07/11/18 0330) Vital Signs (24h Range):  Temp:  [98.1 °F (36.7 °C)-98.7 °F (37.1 °C)] 98.1 °F (36.7 °C)  Pulse:  [62-86] 65  Resp:  [14-19] 17  SpO2:  [100 %] 100 %  BP: (125-137)/(63-83) 126/71     Weight: 81.6 kg (180 lb)  Body mass index is 31.89 kg/m².    Physical Exam:   Constitutional: She is oriented to person, place, and time. She appears well-developed and well-nourished. No distress.    HENT:   Head: Normocephalic and atraumatic.      Cardiovascular: Normal rate, regular rhythm and normal heart sounds.     Pulmonary/Chest: Effort normal and breath sounds normal. She has no wheezes. She has no rales.        Abdominal: Soft. Bowel sounds are normal.             Musculoskeletal: Moves all extremeties. She exhibits no edema.       Neurological: She is alert and oriented to person, place, and time. She has normal reflexes.    Skin: Skin is warm and dry.    Psychiatric: She has a normal mood and affect.       Laboratory:  CBC:     Recent Labs  Lab 07/10/18  2106   WBC 11.18   HGB 6.2*   HCT 23.3*   *     Previous Pelvic U/S:2017  The uterus is normal in size measuring 11.8 x 4.6 x 5.3 cm and has a normal echotexture with a normal 1.6 mm endometrial stripe.  There is a 2.2 cm fibroid at the fundus of the uterus.  The right ovary measures 2.6 x 1.6 x 2.1 cm and has a normal appearance.  The left ovary has been removed  surgically. No free fluid identified. No solid adnexal lesions identified. Internal flow is demonstrated within the right ovary on color doppler.    Assessment/Plan:     Active Diagnoses:    Diagnosis Date Noted POA    PRINCIPAL PROBLEM:  Symptomatic anemia [D64.9] 07/12/2017 Yes    Dysfunctional uterine bleeding [N93.8] 07/10/2018 Yes    Intramural leiomyoma of uterus [D25.1] 07/12/2017 Yes      Problems Resolved During this Admission:    Diagnosis Date Noted Date Resolved POA     1. Continue with her 2nd unit PRBC, repeat CBC 4hr after.   2. Mag citrate for constipation.    F/u in clinic for EMBX and pelvic U/S. Will discuss surgical options outpatient. Pt voiced understanding    Dispo- discharge home by noon if still clinically stable    Naty Carrero MD  [facility]

## 2018-07-11 NOTE — ED PROVIDER NOTES
NAME:  Lani Hurst  CSN:     [id number]  MRN:    [id number]  ADMIT DATE: 7/10/2018        eMERGENCY dEPARTMENT eNCOUnter    CHIEF COMPLAINT    Chief Complaint   Patient presents with    Vaginal Bleeding     pt to triage per [facility] and reports 4 weeks of vag bleeding and worse in last week and feels lightheaded; pt reports lives in [place] and has seen an ob in [place] but currently here visiting for a while; pt reports has not seen her ob in the last few weeks for this       HPI      Lani Hurst is a 48 y.o. female who presents to the ED for evaluation of vaginal bleeding.  Patient states she has had vaginal bleeding continuously for the past month.  She reports that she occasionally has regular periods.  Patient complains of lightheadedness and palpitations and exertional dyspnea.  She has a history of dysfunctional uterine bleeding in the past.  Patient was admitted last year for the same problem.  She has space to be on Aygestin but ran out of her medication 2 days ago          ALLERGIES    Review of patient's allergies indicates:   Allergen Reactions    Morphine Itching       PAST MEDICAL HISTORY  Past Medical History:   Diagnosis Date    Anemia     Heart murmur     History of blood transfusion     Mitral valve prolapse     Ovarian torsion     Rupture of ovary or fallopian tube        SURGICAL HISTORY    Past Surgical History:   Procedure Laterality Date    APPENDECTOMY      Left ovary and follopian tube removal          SOCIAL HISTORY    Social History     Social History    Marital status: Single     Spouse name: N/A    Number of children: N/A    Years of education: N/A     Social History Main Topics    Smoking status: Current Every Day Smoker     Packs/day: 0.50     Types: Cigarettes    Smokeless tobacco: Never Used    Alcohol use No    Drug use: Yes     Types: Marijuana    Sexual activity: Not Asked     Other Topics Concern    None     Social History Narrative    None  "      FAMILY HISTORY    History reviewed. No pertinent family history.    REVIEW OF SYSTEMS   ROS  All Systems otherwise negative except as noted in the History of Present Illness.        PHYSICAL EXAM    Reviewed Triage Note  VITAL SIGNS:   ED Triage Vitals [07/10/18 1924]   Enc Vitals Group      /63      Pulse 74      Resp 18      Temp 98.5 °F (36.9 °C)      Temp src Oral      SpO2 100 %      Weight 180 lb      Height 5' 3"      Head Circumference       Peak Flow       Pain Score       Pain Loc       Pain Edu?       Excl. in GC?        Patient Vitals for the past 24 hrs:   BP Temp Temp src Pulse Resp SpO2 Height Weight   07/10/18 1924 137/63 98.5 °F (36.9 °C) Oral 74 18 100 % 5' 3" (1.6 m) 81.6 kg (180 lb)           Physical Exam    Constitutional:  Well-developed, well-nourished. No acute distress  HENT:  Normocephalic, atraumatic.  Eyes:  EOMI. Conjunctiva normal without discharge.   Neck: Normal range of motion.No stridor. No meningismus.   Respiratory:  Normal breath sounds bilaterally.  No respiratory distress, retractions, or conversational dyspnea. No wheezing. No rhonchi. No rales.   Cardiovascular:  Normal heart rate. Normal rhythm. No pitting lower extremity edema.   GI:  Abdomen soft, non-distended, non-tender. Normal bowel sounds. No guarding, rigidity or rebound.    : No CVA tenderness.   Musculoskeletal:  No gross deformity or limited range of motion of all major joints. No palpable bony deformity. No tenderness to palpation.  Integument:  Warm and dry. No rash.  Neurologic:  Normal motor function. Normal sensory function. No focal deficits noted. Alert and Interactive.  Psychiatric:  Affect normal. Mood normal.         LABS  Pertinent labs reviewed. (See chart for details)   Labs Reviewed   CBC W/ AUTO DIFFERENTIAL - Abnormal; Notable for the following:        Result Value    RBC 3.82 (*)     Hemoglobin 6.2 (*)     Hematocrit 23.3 (*)     MCV 61 (*)     MCH 16.2 (*)     MCHC 26.6 (*)     RDW " 21.0 (*)     Platelets 378 (*)     Platelet Estimate Increased (*)     All other components within normal limits   URINALYSIS - Abnormal; Notable for the following:     Occult Blood UA 3+ (*)     All other components within normal limits   URINALYSIS MICROSCOPIC - Abnormal; Notable for the following:     RBC, UA 90 (*)     Bacteria, UA Few (*)     All other components within normal limits   COMPREHENSIVE METABOLIC PANEL   POCT URINE PREGNANCY   TYPE & SCREEN   PREPARE RBC SOFT         RADIOLOGY    Imaging Results    None         PROCEDURES    Procedures      EKG     Interpreted by ERP:          ED COURSE & MEDICAL DECISION MAKING    Pertinent & Imaging studies reviewed. (See chart for details and specific orders.)        Medications   0.9%  NaCl infusion (for blood administration) (not administered)          The patient is severely anemic and therefore will require a blood transfusion.  I discussed her case with Dr. Carrero for gynecology and she will admit the patient for further care       DISPOSITION  Patient placed in observation in stable condition at No discharge date for patient encounter.      DISCHARGE INSTRUCTIONS & MEDS     Aris Lani Parul   Home Medication Instructions NAVYA:[id number]    Printed on:07/10/18 9730   Medication Information                      norethindrone (AYGESTIN) 5 mg Tab  Take 2 tablets (10 mg total) by mouth once daily.                   New Prescriptions    No medications on file           FINAL IMPRESSION    1. Dysfunctional uterine bleeding    2. Anemia, unspecified type              Blood Pressure Follow-Up Advised  Patient advised to follow up with PCP within 3-5 days for blood pressure re-check if blood pressure is equal to or greater than 120/80.         Critical care time spent with this patient (not including separately billable items) was  0 minutes.     DISCLAIMER: This note was prepared with Dragon NaturallySpeaking voice recognition transcription software. Kasie  syntax, mangled pronouns, and other bizarre constructions may be attributed to that software system.      Luis Crain MD  07/10/2018  9:45 PM          Luis Crain MD  07/10/18 4511

## 2018-07-11 NOTE — PLAN OF CARE
0040 Dr. Carrero notified of pt complaining of itching, headache and seeing halos.  /66, HR 66, Temp 98.4.  Informed Dr Carrero that transfusion has been stopped temporarily until speaking with her.  Orders received to resume transfusion, give benadryl and give tylenol prior to next transfusion.  Transfusion resumed after 4 minutes of being stopped, will continue to monitor.      0230 1st Unit of PRBC's completed.  Pt has no complaints at present.  Pt ambulated to restroom with RN at side. Erika pad 1/4 full of bright red blood. Pt states she is no longer dizzy.     0315 2nd Unit of PRBC's up and infusing.  Pt has no complaints at present.       0510 2nd Unit of PRBC's completed, no reaction.  Pt tolerated well.

## 2018-07-12 NOTE — TELEPHONE ENCOUNTER
I will see patient at 7/17 as scheduled. Order for U/S placed    Naty Carrero MD, FACOG  OB/GYN  Pager: [phone]

## 2018-07-17 ENCOUNTER — PROCEDURE VISIT (OUTPATIENT)
Dept: OBSTETRICS AND GYNECOLOGY | Facility: CLINIC | Age: 48
End: 2018-07-17
Payer: MEDICAID

## 2018-07-17 ENCOUNTER — OFFICE VISIT (OUTPATIENT)
Dept: OBSTETRICS AND GYNECOLOGY | Facility: CLINIC | Age: 48
End: 2018-07-17
Payer: MEDICAID

## 2018-07-17 VITALS — DIASTOLIC BLOOD PRESSURE: 80 MMHG | WEIGHT: 175.5 LBS | SYSTOLIC BLOOD PRESSURE: 102 MMHG | BODY MASS INDEX: 31.09 KG/M2

## 2018-07-17 DIAGNOSIS — N92.6 IRREGULAR BLEEDING: Primary | ICD-10-CM

## 2018-07-17 DIAGNOSIS — N93.9 ABNORMAL UTERINE BLEEDING (AUB): Primary | ICD-10-CM

## 2018-07-17 DIAGNOSIS — D25.9 UTERINE LEIOMYOMA, UNSPECIFIED LOCATION: ICD-10-CM

## 2018-07-17 PROCEDURE — 99999 PR PBB SHADOW E&M-EST. PATIENT-LVL II: CPT | Mod: PBBFAC,,, | Performed by: OBSTETRICS & GYNECOLOGY

## 2018-07-17 PROCEDURE — 76830 TRANSVAGINAL US NON-OB: CPT | Mod: PBBFAC,PO

## 2018-07-17 PROCEDURE — 76830 TRANSVAGINAL US NON-OB: CPT | Mod: 26,S$PBB,, | Performed by: OBSTETRICS & GYNECOLOGY

## 2018-07-17 PROCEDURE — 99212 OFFICE O/P EST SF 10 MIN: CPT | Mod: S$PBB,25,, | Performed by: OBSTETRICS & GYNECOLOGY

## 2018-07-17 PROCEDURE — 99212 OFFICE O/P EST SF 10 MIN: CPT | Mod: PBBFAC,25,PO | Performed by: OBSTETRICS & GYNECOLOGY

## 2018-07-17 NOTE — PROGRESS NOTES
GYNECOLOGY OFFICE NOTE    Reason for visit: hospital f/u    HPI: Pt is a 48 y.o.  female  who presents for hospital follow-up for chronic blood loss anemia requiring blood transfiosn. Cycle: menarche- 11, Interval-  Q month, Duration- 7 days, Flow- heavy, reports dysmenorrhea. She is sexually active.  She does not desire STI screening. She denies vaginal discharge.  Last pap: 13yrs ago, denies hx of abnormal. Last MMG -never. Bleeding has stopped since discharge.  U/s reveals two small uterine fibroids. We discussed treatment options for fibroids including low dose oral contraceptive pills, Mirena (bleeding). DepoLupron, UFE, abdominal myomectomy, endometrial ablation, and hysterectomy.  Patient desires a hysterectomy. Declines medical management. Pt currently uninsured at this time and we discussed proceeding with pap/embx/labs once insurance is available for coverage.     Pelvic US: 18  Uterus: Normal, Uterus position: anteverted Endometrium: clearly visualized  Uterus long 9.7 cm, Uterus ap 5.0 cm, Uterus tr 5.1 cm, Uterus vol 127.7 cmÂ³, Endometrial thickness, total 2.6 mm  Fibroids: Fibroids identified  Findings: Fundal rt, D1 21.4 mm, D2 21.2 mm, D3 17.3 mm, Mean 20.0 mm, Vol 4.094 cmÂ³  Findings: Posterior, D1 16.2 mm, D2 19.4 mm, D3 13.2 mm, Mean 16.3 mm  Vol 2.176 cmÂ³    Right Ovary  =========  Rt ovary: Visualized  Rt ovary D1 2.7 cm  Rt ovary D2 2.4 cm  Rt ovary D3 2.0 cm  Rt ovary mean 2.4 cm  Rt ovary vol 6.9 cmÂ³  Rt ovarian follicle(s): Follicles identified  D1 13.7 mm  D2 13.7 mm  D3 12.6 mm  Mean 13.4 mm  Vol 1.244 cmÂ³    Left Ovary  ========  Lt ovary: removed    Sonographer Comment  ==================  2 fibroids .    Impression  =========  Agree with report. Dr. Carrero.    Past Medical History:   Diagnosis Date    Anemia     Heart murmur     History of blood transfusion     Mitral valve prolapse     Ovarian torsion        Past Surgical History:   Procedure Laterality  Date    APPENDECTOMY      Left ovary and follopian tube removal       s/p ovarian torsion       Family History   Problem Relation Age of Onset    Breast cancer Neg Hx     Colon cancer Neg Hx     Ovarian cancer Neg Hx        Social History   Substance Use Topics    Smoking status: Current Every Day Smoker     Packs/day: 0.50     Types: Cigarettes    Smokeless tobacco: Never Used    Alcohol use Yes      Comment: occasionally       OB History    Para Term  AB Living   2 1     1 1   SAB TAB Ectopic Multiple Live Births   1       1      # Outcome Date GA Lbr Maximilian/2nd Weight Sex Delivery Anes PTL Lv   2 Para 2004    F CS-Unspec   BRAYAN   1 SAB  16w0d                 No current outpatient prescriptions on file.     No current facility-administered medications for this visit.        Allergies: Morphine     /80   Wt 79.6 kg (175 lb 7.8 oz)   LMP 06/15/2018 (Within Weeks)   BMI 31.09 kg/m²     ROS:  GENERAL: Denies fever or chills.   SKIN: Denies rash or lesions.   HEAD: Denies head injury or headache.   CHEST: Denies chest pain or shortness of breath.   CARDIOVASCULAR: Denies palpitations or chest pain.   ABDOMEN: No abdominal pain, constipation, diarrhea, nausea, vomiting or rectal bleeding.   URINARY: No dysuria, hematuria, or burning on urination.  REPRODUCTIVE: See HPI.   BREASTS: see HPI  NEUROLOGIC: Denies syncope or weakness.     Physical Exam:  GENERAL: alert, appears stated age and cooperative  NEUROLOGIC: orientated to person, place and time, normal mood and affect   CHEST: Normal respiratory effort  NECK: normal appearance, no thyromegaly masses or tenderness  SKIN: no acne, striae, hirsutism  Talk only    Diagnosis:  1. Irregular bleeding        Plan:   1. Acute bleeding episode has resolved. Pt to return to clinic for EMBX/pap. U/s reviewed with patient today.          Patient was counseled today on the new ACS guidelines for cervical cytology screening as well as the current  recommendations for breast cancer screening. She was counseled to follow up with her PCP for other routine health maintenance.     Follow-up for embx/pap/preop.      Naty Carrero MD  OB/GYN  Pager: [phone]

## 2018-07-27 ENCOUNTER — TELEPHONE (OUTPATIENT)
Dept: OBSTETRICS AND GYNECOLOGY | Facility: CLINIC | Age: 48
End: 2018-07-27

## 2018-07-27 NOTE — TELEPHONE ENCOUNTER
----- Message from Gina Quispe sent at 7/26/2018 11:08 AM CDT -----  Contact: Self. [phone]  Patient would like to speak with you about scheduling a biopsy because her insurance approved it. Please advise

## 2018-07-27 NOTE — TELEPHONE ENCOUNTER
Pt will like to be schedule for biopsy embx and stated in your notes on 7/14/2018. Pt state she have insurance now to where she can go through with it. What day do you recommend I schedule her. Please advise

## 2018-07-27 NOTE — TELEPHONE ENCOUNTER
She can be placed on the schedule for an EMBX/pap    Naty Carrero MD, FACOG  OB/GYN  Pager: [phone]

## 2018-08-24 ENCOUNTER — OFFICE VISIT (OUTPATIENT)
Dept: OBSTETRICS AND GYNECOLOGY | Facility: CLINIC | Age: 48
End: 2018-08-24
Payer: MEDICAID

## 2018-08-24 VITALS
DIASTOLIC BLOOD PRESSURE: 70 MMHG | SYSTOLIC BLOOD PRESSURE: 104 MMHG | WEIGHT: 171.31 LBS | HEIGHT: 63 IN | BODY MASS INDEX: 30.35 KG/M2

## 2018-08-24 DIAGNOSIS — N92.6 IRREGULAR BLEEDING: Primary | ICD-10-CM

## 2018-08-24 DIAGNOSIS — D25.9 UTERINE LEIOMYOMA, UNSPECIFIED LOCATION: ICD-10-CM

## 2018-08-24 DIAGNOSIS — Z12.39 SCREENING FOR BREAST CANCER: ICD-10-CM

## 2018-08-24 DIAGNOSIS — Z12.4 SCREENING FOR CERVICAL CANCER: ICD-10-CM

## 2018-08-24 PROCEDURE — 88305 TISSUE EXAM BY PATHOLOGIST: CPT | Performed by: PATHOLOGY

## 2018-08-24 PROCEDURE — 88141 CYTOPATH C/V INTERPRET: CPT | Mod: ,,, | Performed by: PATHOLOGY

## 2018-08-24 PROCEDURE — 99499 UNLISTED E&M SERVICE: CPT | Mod: S$PBB,,, | Performed by: OBSTETRICS & GYNECOLOGY

## 2018-08-24 PROCEDURE — 88175 CYTOPATH C/V AUTO FLUID REDO: CPT | Performed by: PATHOLOGY

## 2018-08-24 PROCEDURE — 58100 BIOPSY OF UTERUS LINING: CPT | Mod: PBBFAC,PO | Performed by: OBSTETRICS & GYNECOLOGY

## 2018-08-24 PROCEDURE — 99999 PR PBB SHADOW E&M-EST. PATIENT-LVL III: CPT | Mod: PBBFAC,,, | Performed by: OBSTETRICS & GYNECOLOGY

## 2018-08-24 PROCEDURE — 87624 HPV HI-RISK TYP POOLED RSLT: CPT

## 2018-08-24 PROCEDURE — 58100 BIOPSY OF UTERUS LINING: CPT | Mod: S$PBB,,, | Performed by: OBSTETRICS & GYNECOLOGY

## 2018-08-24 PROCEDURE — 99213 OFFICE O/P EST LOW 20 MIN: CPT | Mod: PBBFAC,PO | Performed by: OBSTETRICS & GYNECOLOGY

## 2018-08-24 NOTE — PROGRESS NOTES
DATE: August24th, 2018    TIME: 1335 PM    PROCEDURE: Endometrial biopsy    INDICATION: AUB-L, has tried OCP/Depo and desires definitive management    PATIENT CONSENT:     PRE ENDOMETRIAL BIOPSY COUNSELING:The patient was informed of the risk of bleeding, infection, uterine perforation and pain and that the test will rule-out endometrial cancer with accuracy greater than 95%. She was counseled on the alternatives to endometrial biopsy.  All of her questions were answered. Counseling lasted approximately 15 minutes and all her questions were answered.    Patient gives written consent    ANESTHESIA: None    Labs: UPT performed prior to the procedure was negative.    PROCEDURE NOTE:  The cervix was visualized with a speculum and swabbed with Betadinex3.  The anterior lip of the cervix was not grasped with a single toothed tenaculum, and a sterile endometrial pipelle was inserted into the uterus to a sound length of 9 cm. 2 passes were made with the pipelle and moderate amount of tissue was obtained. The specimen was placed in formalin and sent to Pathology for evaluation.     COMPLICATIONS: None    DISPOSITION: The patient tolerated the above procedure well.    POST ENDOMETRIAL BIOPSY COUNSELING:  - Manage post biopsy cramping with NSAIDs or Tylenol.  - Expect spotting or light bleeding for a few days.  - Report bleeding heavier than a period, fever > 101.0 F, worsening pain or a foul smelling vaginal discharge.    Follow-up pending biopsy results/preop    Naty Carrero MD, FACOG  OB/GYN  Pager: [phone]

## 2018-08-29 ENCOUNTER — TELEPHONE (OUTPATIENT)
Dept: OBSTETRICS AND GYNECOLOGY | Facility: CLINIC | Age: 48
End: 2018-08-29

## 2018-08-29 NOTE — TELEPHONE ENCOUNTER
----- Message from Naty Carrero MD sent at 8/28/2018  7:15 PM CDT -----  Please inform pt of normal endometrial biopsy results

## 2018-08-29 NOTE — TELEPHONE ENCOUNTER
Contacted pt and informed her of normal EMBX results. Patient verbalized understanding. Pt asked if it was normal that she is still bleeding, pt reports light bleeding, informed pt to monitor bleeding if it picks up and become heavy where she is changing her pad every 30mins-1hr to go the ED, Patient verbalized understanding.

## 2018-08-30 ENCOUNTER — TELEPHONE (OUTPATIENT)
Dept: OBSTETRICS AND GYNECOLOGY | Facility: HOSPITAL | Age: 48
End: 2018-08-30

## 2018-08-30 DIAGNOSIS — Z01.818 PREOP TESTING: Primary | ICD-10-CM

## 2018-08-30 LAB
HPV HR 12 DNA CVX QL NAA+PROBE: POSITIVE
HPV16 AG SPEC QL: POSITIVE
HPV18 DNA SPEC QL NAA+PROBE: NEGATIVE

## 2018-08-30 RX ORDER — MEDROXYPROGESTERONE ACETATE 10 MG/1
TABLET ORAL
Qty: 84 TABLET | Refills: 0 | Status: SHIPPED | OUTPATIENT
Start: 2018-08-30 | End: 2018-10-12 | Stop reason: SDUPTHER

## 2018-08-30 NOTE — TELEPHONE ENCOUNTER
Called to inform pt of endometrial biopsy results and pap smear. EMBX negative and pap +HSIL. Discussed usually colpo is performed afterwards pt we are planning for hysterectomy which would treat this as well. Explained that she will still need paps after her hysterectomy for at least 20 yrs. Pt desires to skip colpo and proceed with hysterectomy as planned. Also told me she is having heavy bleeding. Rx for provera taper sent. Has appointment for preop clearance- order for EKG also placed.     Naty Carrero MD, FACOG  OB/GYN  Pager: [phone]

## 2018-08-31 ENCOUNTER — TELEPHONE (OUTPATIENT)
Dept: OBSTETRICS AND GYNECOLOGY | Facility: CLINIC | Age: 48
End: 2018-08-31

## 2018-08-31 NOTE — TELEPHONE ENCOUNTER
----- Message from Nancy Aguirre sent at 8/30/2018  8:34 AM CDT -----  Contact: [phone]  Patient would like her Rx to go [facility] [address] [place] [id number] [phone]. Please call.

## 2018-09-04 ENCOUNTER — CLINICAL SUPPORT (OUTPATIENT)
Dept: LAB | Facility: HOSPITAL | Age: 48
End: 2018-09-04
Attending: OBSTETRICS & GYNECOLOGY
Payer: MEDICAID

## 2018-09-04 ENCOUNTER — HOSPITAL ENCOUNTER (OUTPATIENT)
Dept: RADIOLOGY | Facility: HOSPITAL | Age: 48
Discharge: HOME | End: 2018-09-04
Attending: OBSTETRICS & GYNECOLOGY
Payer: MEDICAID

## 2018-09-04 DIAGNOSIS — Z01.818 PREOP TESTING: ICD-10-CM

## 2018-09-04 DIAGNOSIS — Z12.39 SCREENING FOR BREAST CANCER: ICD-10-CM

## 2018-09-04 PROCEDURE — 77063 BREAST TOMOSYNTHESIS BI: CPT | Mod: 26,,, | Performed by: RADIOLOGY

## 2018-09-04 PROCEDURE — 93005 ELECTROCARDIOGRAM TRACING: CPT

## 2018-09-04 PROCEDURE — 77067 SCR MAMMO BI INCL CAD: CPT | Mod: 26,,, | Performed by: RADIOLOGY

## 2018-09-04 PROCEDURE — 93010 ELECTROCARDIOGRAM REPORT: CPT | Mod: ,,, | Performed by: INTERNAL MEDICINE

## 2018-09-04 PROCEDURE — 77067 SCR MAMMO BI INCL CAD: CPT | Mod: TC

## 2018-09-05 ENCOUNTER — TELEPHONE (OUTPATIENT)
Dept: OBSTETRICS AND GYNECOLOGY | Facility: CLINIC | Age: 48
End: 2018-09-05

## 2018-09-05 NOTE — TELEPHONE ENCOUNTER
----- Message from Naty Carrero MD sent at 9/5/2018  8:58 AM CDT -----  Please inform of normal mammogram results

## 2018-09-10 ENCOUNTER — OFFICE VISIT (OUTPATIENT)
Dept: FAMILY MEDICINE | Facility: HOSPITAL | Age: 48
End: 2018-09-10
Payer: MEDICAID

## 2018-09-10 ENCOUNTER — HOSPITAL ENCOUNTER (OUTPATIENT)
Dept: RADIOLOGY | Facility: HOSPITAL | Age: 48
Discharge: HOME | End: 2018-09-10
Attending: STUDENT IN AN ORGANIZED HEALTH CARE EDUCATION/TRAINING PROGRAM
Payer: MEDICAID

## 2018-09-10 VITALS
HEIGHT: 63 IN | HEART RATE: 77 BPM | SYSTOLIC BLOOD PRESSURE: 108 MMHG | DIASTOLIC BLOOD PRESSURE: 74 MMHG | WEIGHT: 175.5 LBS | BODY MASS INDEX: 31.1 KG/M2

## 2018-09-10 DIAGNOSIS — Z01.818 PRE-OPERATIVE CLEARANCE: ICD-10-CM

## 2018-09-10 DIAGNOSIS — D50.0 ANEMIA DUE TO BLOOD LOSS, CHRONIC: ICD-10-CM

## 2018-09-10 DIAGNOSIS — F17.200 TOBACCO DEPENDENCE: ICD-10-CM

## 2018-09-10 DIAGNOSIS — D25.1 INTRAMURAL LEIOMYOMA OF UTERUS: Primary | ICD-10-CM

## 2018-09-10 DIAGNOSIS — Z86.79 HISTORY OF MITRAL VALVE PROLAPSE: ICD-10-CM

## 2018-09-10 PROCEDURE — 71046 X-RAY EXAM CHEST 2 VIEWS: CPT | Mod: 26,,, | Performed by: RADIOLOGY

## 2018-09-10 PROCEDURE — 99213 OFFICE O/P EST LOW 20 MIN: CPT | Mod: 25 | Performed by: STUDENT IN AN ORGANIZED HEALTH CARE EDUCATION/TRAINING PROGRAM

## 2018-09-10 PROCEDURE — 71046 X-RAY EXAM CHEST 2 VIEWS: CPT | Mod: TC,FY

## 2018-09-10 NOTE — PROGRESS NOTES
Subjective:       Patient ID: Lani Hurst is a 48 y.o. female.    Chief Complaint: pre-operative clearance (clearence)    47 yo F presents to clinic for pre-operative clearance for hysterectomy 2/2 leiomyomas and dysfunctional uterine bleeding. Patient states that she has had heavy bleeding for her entire life and now it has progressed to the point where she recently had to be admitted to the hospital for a blood transfusion. Patient also recently endorsed craving and eating flour. Denies craving or eating ice chips or any other non-food substances. Patient with a 15 pack year history and states that she attempted to quit smoking in the past but is unsure if she would like to or is able to at this time. Previously ordered EKG reviewed and normal sinus rhythm.        Past Medical History:  Past Medical History:   Diagnosis Date    Anemia     Heart murmur     History of blood transfusion     Mitral valve prolapse     Ovarian torsion        Past Surgical History:  Past Surgical History:   Procedure Laterality Date    APPENDECTOMY      Left ovary and follopian tube removal       s/p ovarian torsion    OOPHORECTOMY Left        Allergies:  Review of patient's allergies indicates:   Allergen Reactions    Morphine Itching       Medications:  Prior to Admission medications    Medication Sig Start Date End Date Taking? Authorizing Provider   medroxyPROGESTERone (PROVERA) 10 MG tablet 2 tablets TID x 7 days then 2 tablets daily x 21 days 8/30/18  Yes Naty Carrero MD       Family History:  Family History   Problem Relation Age of Onset    Prostate cancer Father     Breast cancer Neg Hx     Colon cancer Neg Hx     Ovarian cancer Neg Hx        Social History:  Social History     Tobacco Use    Smoking status: Current Every Day Smoker     Packs/day: 0.50     Types: Cigarettes    Smokeless tobacco: Never Used   Substance Use Topics    Alcohol use: Yes     Comment: occasionally    Drug use: Yes     Types:  Marijuana       Health Maintenance:   Patient's PCP is: Stefan Chaparro  Immunizations:   Currently on File with [facility]:   There is no immunization history on file for this patient.  TDap is up to date, Influenza is not up to date, Pneumovax is not up to date.  Cancer Screening:  PAP: is up to date.  Mammogram: is up to date.   Colonoscopy: is up to date.       Review of Systems   Constitutional: Positive for activity change and fatigue. Negative for chills and fever.   HENT: Negative for congestion and sinus pressure.    Eyes: Negative for visual disturbance.   Respiratory: Negative for chest tightness and shortness of breath.    Cardiovascular: Negative for chest pain.   Gastrointestinal: Negative for abdominal distention, abdominal pain, diarrhea, nausea and vomiting.   Endocrine: Negative for polyuria.   Genitourinary: Negative for frequency.   Musculoskeletal: Negative for arthralgias and myalgias.   Skin: Negative for color change.   Neurological: Negative for dizziness, weakness and light-headedness.   Psychiatric/Behavioral: Negative for agitation and behavioral problems.       Objective:      Vitals:    09/10/18 1412   BP: 108/74   Pulse: 77     Physical Exam   Constitutional: She is oriented to person, place, and time. She appears well-developed and well-nourished. No distress.   HENT:   Head: Normocephalic and atraumatic.   Eyes: EOM are normal.   Neck: Normal range of motion. Neck supple.   Cardiovascular: Normal rate, regular rhythm and normal heart sounds.   Pulmonary/Chest: Effort normal and breath sounds normal.   Abdominal: Soft. Bowel sounds are normal. She exhibits no distension. There is no tenderness.   Musculoskeletal: She exhibits no edema or tenderness.   Neurological: She is alert and oriented to person, place, and time.   Skin: Skin is warm.   Psychiatric: She has a normal mood and affect. Her behavior is normal.       Assessment:       1. Intramural leiomyoma of uterus   2.  Pre-operative clearance    3. Anemia due to blood loss, chronic    4. Tobacco dependence    5. History of mitral valve prolapse        Plan:       Intramural leiomyoma of uterus  Comments:  Likely cause of chronic blood loss  Will be undergoing hysterectomy to prevent further blood loss    Pre-operative clearance  -     X-Ray Chest PA And Lateral; Future  -     CBC auto differential; Future  -     Comprehensive metabolic panel; Future  -     Hemoglobin A1c; Future  -     Lipid panel; Future    Anemia due to blood loss, chronic    Tobacco dependence  Comments:  Discussed possible cessation and the affects that it would have on her surgery recovery as well as long term health.   Orders:  -     X-Ray Chest PA And Lateral; Future    History of mitral valve prolapse  Comments:  EKG reviewed and wnl    Follow-up in about 5 days (around 9/15/2018) for review of labs/imaging and pre-operative clearance.

## 2018-09-14 ENCOUNTER — OFFICE VISIT (OUTPATIENT)
Dept: FAMILY MEDICINE | Facility: HOSPITAL | Age: 48
End: 2018-09-14
Attending: FAMILY MEDICINE
Payer: MEDICAID

## 2018-09-14 VITALS
DIASTOLIC BLOOD PRESSURE: 79 MMHG | WEIGHT: 169.31 LBS | BODY MASS INDEX: 30 KG/M2 | SYSTOLIC BLOOD PRESSURE: 122 MMHG | HEIGHT: 63 IN | HEART RATE: 74 BPM

## 2018-09-14 DIAGNOSIS — D25.1 INTRAMURAL LEIOMYOMA OF UTERUS: Primary | ICD-10-CM

## 2018-09-14 DIAGNOSIS — Z01.818 PRE-OPERATIVE CLEARANCE: ICD-10-CM

## 2018-09-14 DIAGNOSIS — D50.0 ANEMIA DUE TO BLOOD LOSS, CHRONIC: ICD-10-CM

## 2018-09-14 PROCEDURE — 99213 OFFICE O/P EST LOW 20 MIN: CPT | Performed by: STUDENT IN AN ORGANIZED HEALTH CARE EDUCATION/TRAINING PROGRAM

## 2018-09-17 NOTE — PROGRESS NOTES
Subjective:       Patient ID: Lani Hurst is a 48 y.o. female.    Chief Complaint: Pre-op Exam    47 yo F presents to clinic for pre-operative clearance for hysterectomy. Labs and imaging reviewed with patient at this visit. Questions answered about the procedure. Patient expressed that she was still strongly considering smoking cessation. No further issues at this time.        Review of Systems   Constitutional: Positive for activity change and fatigue. Negative for chills and fever.   HENT: Negative for congestion and sinus pressure.    Eyes: Negative for visual disturbance.   Respiratory: Negative for chest tightness and shortness of breath.    Cardiovascular: Negative for chest pain.   Gastrointestinal: Negative for abdominal distention, abdominal pain, diarrhea, nausea and vomiting.   Endocrine: Negative for polyuria.   Genitourinary: Negative for frequency.   Musculoskeletal: Negative for arthralgias and myalgias.   Skin: Negative for color change.   Neurological: Negative for dizziness, weakness and light-headedness.   Psychiatric/Behavioral: Negative for agitation and behavioral problems.       Objective:      Vitals:    09/14/18 0944   BP: 122/79   Pulse: 74     Physical Exam   Constitutional: She is oriented to person, place, and time. She appears well-developed and well-nourished. No distress.   HENT:   Head: Normocephalic and atraumatic.   Eyes: EOM are normal.   Neck: Normal range of motion. Neck supple.   Cardiovascular: Normal rate, regular rhythm and normal heart sounds.   Pulmonary/Chest: Effort normal and breath sounds normal.   Abdominal: Soft. Bowel sounds are normal. She exhibits no distension. There is no tenderness.   Musculoskeletal: She exhibits no edema or tenderness.   Neurological: She is alert and oriented to person, place, and time.   Skin: Skin is warm.   Psychiatric: She has a normal mood and affect. Her behavior is normal.       Assessment:     1. Intramural leiomyoma of  uterus  2. Pre-operative clearance  3. Anemia due to blood loss, chronic    Plan:     Lani was seen today for pre-op exam.    Diagnoses and all orders for this visit:    Intramural leiomyoma of uterus    Pre-operative clearance  Comments:  CXR revealed no abnormalities.  Pt cleared for hysterectomy    Anemia due to blood loss, chronic  Comments:  H/H reviewed with patient. Increased from last blood draw in hospital. Stable level to proceed with hysterectomy.       Follow-up in about 3 months (around 12/14/2018) for post surgical follow up.

## 2018-09-24 NOTE — PROGRESS NOTES
I have reviewed the notes, assessments, and/or procedures performed, I concur with her/his documentation of Lani Hurst.

## 2018-09-26 ENCOUNTER — TELEPHONE (OUTPATIENT)
Dept: OBSTETRICS AND GYNECOLOGY | Facility: CLINIC | Age: 48
End: 2018-09-26

## 2018-09-26 NOTE — TELEPHONE ENCOUNTER
Returned pt's call, pt states she is calling to see when will she be scheduled for surgery. Informed pt only the surgery coordinator schedules surgery and can take anywhere from 2-3wks to contact her to get her scheduled. Patient verbalized understanding.

## 2018-09-26 NOTE — TELEPHONE ENCOUNTER
----- Message from Evangelina Pearce sent at 9/25/2018 10:59 AM CDT -----  Contact: Self [phone]  Patient is calling to schedule a procedure. Please advice

## 2018-10-03 ENCOUNTER — TELEPHONE (OUTPATIENT)
Dept: ADMINISTRATIVE | Facility: OTHER | Age: 48
End: 2018-10-03

## 2018-10-03 DIAGNOSIS — D21.9 FIBROIDS: Primary | ICD-10-CM

## 2018-10-11 ENCOUNTER — HOSPITAL ENCOUNTER (EMERGENCY)
Facility: HOSPITAL | Age: 48
End: 2018-10-11
Attending: EMERGENCY MEDICINE
Payer: MEDICAID

## 2018-10-11 ENCOUNTER — TELEPHONE (OUTPATIENT)
Dept: OBSTETRICS AND GYNECOLOGY | Facility: CLINIC | Age: 48
End: 2018-10-11

## 2018-10-11 ENCOUNTER — NURSE TRIAGE (OUTPATIENT)
Dept: ADMINISTRATIVE | Facility: CLINIC | Age: 48
End: 2018-10-11

## 2018-10-11 VITALS
DIASTOLIC BLOOD PRESSURE: 64 MMHG | OXYGEN SATURATION: 100 % | BODY MASS INDEX: 30.12 KG/M2 | HEIGHT: 63 IN | SYSTOLIC BLOOD PRESSURE: 124 MMHG | RESPIRATION RATE: 18 BRPM | TEMPERATURE: 99 F | HEART RATE: 84 BPM | WEIGHT: 170 LBS

## 2018-10-11 DIAGNOSIS — D25.9 UTERINE LEIOMYOMA, UNSPECIFIED LOCATION: Primary | ICD-10-CM

## 2018-10-11 DIAGNOSIS — N83.201 CYST OF RIGHT OVARY: ICD-10-CM

## 2018-10-11 LAB
ALBUMIN SERPL BCP-MCNC: 3.8 G/DL
ALP SERPL-CCNC: 84 U/L
ALT SERPL W/O P-5'-P-CCNC: 20 U/L
ANION GAP SERPL CALC-SCNC: 9 MMOL/L
ANISOCYTOSIS BLD QL SMEAR: SLIGHT
AST SERPL-CCNC: 26 U/L
B-HCG UR QL: NEGATIVE
BACTERIA #/AREA URNS HPF: ABNORMAL /HPF
BASOPHILS # BLD AUTO: 0.06 K/UL
BASOPHILS NFR BLD: 0.6 %
BILIRUB SERPL-MCNC: 0.2 MG/DL
BILIRUB UR QL STRIP: NEGATIVE
BUN SERPL-MCNC: 7 MG/DL
CALCIUM SERPL-MCNC: 9.2 MG/DL
CHLORIDE SERPL-SCNC: 111 MMOL/L
CLARITY UR: CLEAR
CO2 SERPL-SCNC: 21 MMOL/L
COLOR UR: YELLOW
CREAT SERPL-MCNC: 0.9 MG/DL
CTP QC/QA: YES
DACRYOCYTES BLD QL SMEAR: ABNORMAL
DIFFERENTIAL METHOD: ABNORMAL
EOSINOPHIL # BLD AUTO: 0.3 K/UL
EOSINOPHIL NFR BLD: 2.6 %
ERYTHROCYTE [DISTWIDTH] IN BLOOD BY AUTOMATED COUNT: 19.1 %
EST. GFR  (AFRICAN AMERICAN): >60 ML/MIN/1.73 M^2
EST. GFR  (NON AFRICAN AMERICAN): >60 ML/MIN/1.73 M^2
GLUCOSE SERPL-MCNC: 105 MG/DL
GLUCOSE UR QL STRIP: NEGATIVE
HCT VFR BLD AUTO: 33.2 %
HGB BLD-MCNC: 9.8 G/DL
HGB UR QL STRIP: ABNORMAL
KETONES UR QL STRIP: NEGATIVE
LEUKOCYTE ESTERASE UR QL STRIP: NEGATIVE
LYMPHOCYTES # BLD AUTO: 2.4 K/UL
LYMPHOCYTES NFR BLD: 22.9 %
MCH RBC QN AUTO: 20.5 PG
MCHC RBC AUTO-ENTMCNC: 29.5 G/DL
MCV RBC AUTO: 69 FL
MICROSCOPIC COMMENT: ABNORMAL
MONOCYTES # BLD AUTO: 0.8 K/UL
MONOCYTES NFR BLD: 7.8 %
NEUTROPHILS # BLD AUTO: 6.8 K/UL
NEUTROPHILS NFR BLD: 66.1 %
NITRITE UR QL STRIP: NEGATIVE
OVALOCYTES BLD QL SMEAR: ABNORMAL
PH UR STRIP: 6 [PH] (ref 5–8)
PLATELET # BLD AUTO: 452 K/UL
PLATELET BLD QL SMEAR: ABNORMAL
PMV BLD AUTO: 8.9 FL
POIKILOCYTOSIS BLD QL SMEAR: SLIGHT
POTASSIUM SERPL-SCNC: 3.7 MMOL/L
PROT SERPL-MCNC: 8.3 G/DL
PROT UR QL STRIP: NEGATIVE
RBC # BLD AUTO: 4.79 M/UL
RBC #/AREA URNS HPF: 3 /HPF (ref 0–4)
SODIUM SERPL-SCNC: 141 MMOL/L
SP GR UR STRIP: >=1.03 (ref 1–1.03)
SQUAMOUS #/AREA URNS HPF: 7 /HPF
TARGETS BLD QL SMEAR: ABNORMAL
URN SPEC COLLECT METH UR: ABNORMAL
UROBILINOGEN UR STRIP-ACNC: 1 EU/DL
WBC # BLD AUTO: 10.3 K/UL
WBC #/AREA URNS HPF: 4 /HPF (ref 0–5)
WBC CLUMPS URNS QL MICRO: ABNORMAL
YEAST URNS QL MICRO: ABNORMAL

## 2018-10-11 PROCEDURE — 80053 COMPREHEN METABOLIC PANEL: CPT

## 2018-10-11 PROCEDURE — 25000003 PHARM REV CODE 250: Performed by: PHYSICIAN ASSISTANT

## 2018-10-11 PROCEDURE — 96374 THER/PROPH/DIAG INJ IV PUSH: CPT

## 2018-10-11 PROCEDURE — 87491 CHLMYD TRACH DNA AMP PROBE: CPT

## 2018-10-11 PROCEDURE — 63600175 PHARM REV CODE 636 W HCPCS: Performed by: PHYSICIAN ASSISTANT

## 2018-10-11 PROCEDURE — 99284 EMERGENCY DEPT VISIT MOD MDM: CPT

## 2018-10-11 PROCEDURE — 85025 COMPLETE CBC W/AUTO DIFF WBC: CPT

## 2018-10-11 PROCEDURE — 81000 URINALYSIS NONAUTO W/SCOPE: CPT

## 2018-10-11 PROCEDURE — 81025 URINE PREGNANCY TEST: CPT | Performed by: PHYSICIAN ASSISTANT

## 2018-10-11 RX ORDER — HYDROCODONE BITARTRATE AND ACETAMINOPHEN 5; 325 MG/1; MG/1
1 TABLET ORAL
Status: COMPLETED | OUTPATIENT
Start: 2018-10-11 | End: 2018-10-11

## 2018-10-11 RX ORDER — DOCUSATE SODIUM 100 MG/1
100 CAPSULE, LIQUID FILLED ORAL 2 TIMES DAILY
Qty: 60 CAPSULE | Refills: 0 | Status: SHIPPED | OUTPATIENT
Start: 2018-10-11 | End: 2019-06-25 | Stop reason: CLARIF

## 2018-10-11 RX ORDER — KETOROLAC TROMETHAMINE 10 MG/1
10 TABLET, FILM COATED ORAL EVERY 6 HOURS
Qty: 12 TABLET | Refills: 0 | Status: SHIPPED | OUTPATIENT
Start: 2018-10-11 | End: 2018-10-14

## 2018-10-11 RX ORDER — KETOROLAC TROMETHAMINE 30 MG/ML
15 INJECTION, SOLUTION INTRAMUSCULAR; INTRAVENOUS
Status: COMPLETED | OUTPATIENT
Start: 2018-10-11 | End: 2018-10-11

## 2018-10-11 RX ORDER — HYDROCODONE BITARTRATE AND ACETAMINOPHEN 5; 325 MG/1; MG/1
1 TABLET ORAL EVERY 8 HOURS PRN
Qty: 12 TABLET | Refills: 0 | Status: ON HOLD | OUTPATIENT
Start: 2018-10-11 | End: 2018-10-16

## 2018-10-11 RX ADMIN — HYDROCODONE BITARTRATE AND ACETAMINOPHEN 1 TABLET: 5; 325 TABLET ORAL at 06:10

## 2018-10-11 RX ADMIN — KETOROLAC TROMETHAMINE 15 MG: 30 INJECTION, SOLUTION INTRAMUSCULAR at 04:10

## 2018-10-11 NOTE — TELEPHONE ENCOUNTER
Scheduled for hysterectomy on 11/5.  Had biopsy on 8/24 and was bleeding, put on Provera, but the last 3 days has been having spotting, but she is having severe abdominal pain, and in back and down the legs, 15/10, constant pulling pain in her abdomen.  Has not taken anything for pain.    Reason for Disposition   SEVERE abdominal pain (e.g., excruciating)    Protocols used: ST ABDOMINAL PAIN - FEMALE-A-OH

## 2018-10-11 NOTE — TELEPHONE ENCOUNTER
----- Message from Mary Del Castillo sent at 10/11/2018  2:37 PM CDT -----  Contact: [phone]/ self  Patient called in returning your call. Please advise.

## 2018-10-11 NOTE — TELEPHONE ENCOUNTER
Returned pt's call. Pt states that for the last past three days she's been having severe abdominall pain. Pt was asked has she taken any pain medication to relieve the pain. Pt states no that she only takes the medication that Dr Carrero prescribed to help with the bleeding. Pt states that she's on her way to the ER.

## 2018-10-11 NOTE — ED PROVIDER NOTES
Encounter Date: 10/11/2018       History     Chief Complaint   Patient presents with    Female  Problem     Hx of uterine fibroids, scheduled for hysterectomy on 11/5, pain for the past 3 days     Afebrile 48-year-old female with history heart murmur, MVP, anemia, left-sided ovarian torsion and uterine fibroids followed by Dr. Carrero presents the ED for evaluation of lower abdominal pain. She states that the lower abdominal pain has gradually increased over the past several days.  She does describe it as a constant sharp/cramping sensation.  She states that it is more prevalent on the right lower pelvic region.  She also reports some vaginal spotting over the past few days despite taking Provera.  Patient denies any other symptoms including nausea, vomiting, fever, chills, dysuria, hematuria or vaginal discharge. She does report that she is due to have hysterectomy on 11/05 for recurrent vaginal bleeding and uterine fibroids.  She has not tried any medications for the symptoms.      The history is provided by the patient.     Review of patient's allergies indicates:   Allergen Reactions    Morphine Itching     Past Medical History:   Diagnosis Date    Anemia     Heart murmur     History of blood transfusion     Mitral valve prolapse     Ovarian torsion      Past Surgical History:   Procedure Laterality Date    APPENDECTOMY      Left ovary and follopian tube removal       s/p ovarian torsion    OOPHORECTOMY Left      Family History   Problem Relation Age of Onset    Prostate cancer Father     Breast cancer Neg Hx     Colon cancer Neg Hx     Ovarian cancer Neg Hx      Social History     Tobacco Use    Smoking status: Current Every Day Smoker     Packs/day: 0.50     Types: Cigarettes    Smokeless tobacco: Never Used   Substance Use Topics    Alcohol use: Yes     Comment: occasionally    Drug use: Yes     Types: Marijuana     Review of Systems   Constitutional: Negative for chills and fever.   HENT:  Negative for sore throat.    Respiratory: Negative for shortness of breath.    Cardiovascular: Negative for chest pain.   Gastrointestinal: Positive for abdominal pain. Negative for constipation, diarrhea, nausea and vomiting.   Genitourinary: Positive for pelvic pain and vaginal bleeding. Negative for dysuria, flank pain and vaginal discharge.   Musculoskeletal: Negative for back pain.   Skin: Negative for rash.   Neurological: Negative for weakness.   Hematological: Does not bruise/bleed easily.       Physical Exam     Initial Vitals [10/11/18 1522]   BP Pulse Resp Temp SpO2   129/72 96 17 98.9 °F (37.2 °C) 100 %      MAP       --         Physical Exam    Nursing note and vitals reviewed.  Constitutional: Vital signs are normal. She appears well-developed and well-nourished. She is cooperative.  Non-toxic appearance. She does not appear ill. No distress.   HENT:   Head: Normocephalic and atraumatic.   Eyes: Conjunctivae and lids are normal.   Neck: Neck supple. No neck rigidity.   Cardiovascular: Normal rate and regular rhythm.   Pulmonary/Chest: Breath sounds normal. No respiratory distress. She has no wheezes. She has no rhonchi.   Abdominal: Soft. Normal appearance. She exhibits no distension. There is tenderness. There is no rigidity, no rebound, no guarding and no CVA tenderness.       Genitourinary: Pelvic exam was performed with patient supine. Uterus is not tender. Cervix exhibits no motion tenderness and no discharge. Right adnexum displays no tenderness. Left adnexum displays no tenderness. No tenderness in the vagina.   Genitourinary Comments: Scant blood in the vaginal vault; no cervical motion tenderness, discharge no adnexal tenderness or fullness appreciated.   Musculoskeletal: Normal range of motion.   Neurological: She is alert and oriented to person, place, and time. GCS eye subscore is 4. GCS verbal subscore is 5. GCS motor subscore is 6.   Skin: Skin is warm, dry and intact. No rash noted.    Psychiatric: She has a normal mood and affect. Her speech is normal and behavior is normal. Thought content normal.         ED Course   Procedures  Labs Reviewed   CBC W/ AUTO DIFFERENTIAL - Abnormal; Notable for the following components:       Result Value    Hemoglobin 9.8 (*)     Hematocrit 33.2 (*)     MCV 69 (*)     MCH 20.5 (*)     MCHC 29.5 (*)     RDW 19.1 (*)     Platelets 452 (*)     MPV 8.9 (*)     All other components within normal limits   URINALYSIS, REFLEX TO URINE CULTURE - Abnormal; Notable for the following components:    Specific Gravity, UA >=1.030 (*)     Occult Blood UA 3+ (*)     All other components within normal limits    Narrative:     Preferred Collection Type->Urine, Clean Catch   URINALYSIS MICROSCOPIC - Abnormal; Notable for the following components:    Bacteria, UA Few (*)     All other components within normal limits    Narrative:     Preferred Collection Type->Urine, Clean Catch   COMPREHENSIVE METABOLIC PANEL   POCT URINE PREGNANCY          Imaging Results          US Pelvis Comp with Transvag NON-OB (xpd (Final result)  Result time 10/11/18 16:47:35    Final result by Sebas Xiao MD (10/11/18 16:47:35)                 Impression:      1. Small solitary uterine fibroid.  2. Right ovarian suspected complex/hemorrhagic cyst.  Follow-up pelvic ultrasound in 6-8 weeks is recommended to ensure stability/resolution.  3. Left ovary surgically removed.      Electronically signed by: Sebas Xiao MD  Date:    10/11/2018  Time:    16:47             Narrative:    EXAMINATION:  US PELVIS COMP WITH TRANSVAG NON-OB (XPD)    CLINICAL HISTORY:  pelvic pain;    TECHNIQUE:  Transabdominal sonography of the pelvis was performed, followed by transvaginal sonography to better evaluate the uterus and ovaries.    COMPARISON:  Pelvic ultrasound 07/12/2017    FINDINGS:  Uterus:    Size: 10.7 x 3.2 x 5.3 cm    Masses: There is a solitary 2.3 cm intramural fibroid along the anterior aspect of the upper  uterine segment/fundus.    Endometrium: Normal in this pre menopausal patient, measuring 2-3 mm.  Several small nabothian cysts noted.    Right ovary:    Size: 5.3 x 3.8 x 3.5 cm    Appearance: Contains a 3.4 x 2.2 x 3.5 cm well-circumscribed nonvascular hypoechoic structure with low level internal echoes.    Vascular flow: Normal.    Left ovary: Removed.  No left adnexal mass seen.    Free Fluid:    None.                                 Medical Decision Making:   Initial Assessment:   48-year-old female presents to the ED for evaluation of l pelvic pain. Patient has history of uterine fibroids and is scheduled for hysterectomy on 11/05.  She states that she has had gradual increase of pain over the past 3 days.  She does note some vaginal spotting.  She is a patient of Dr. Carrero.  She appears in some distress secondary to pain. She is nontoxic appearing.  She does have some tenderness to palpation of the lower pelvic region with no distension, fullness, mass or guarding.   exam with scant vaginal bleeding; no discharge no adnexal tenderness and no uterine tenderness. There is not appear to be gross amount of blood.  Differential Diagnosis:   Differential Diagnosis includes, but is not limited to:  Pregnancy complication, normal menses, STD, foreign body, trauma, uterine fibroid, ovarian cyst ruptured    Clinical Tests:   Lab Tests: Ordered and Reviewed  Radiological Study: Ordered and Reviewed  ED Management:  Labs reveal H&H at 9 and 33 which which is an improvement in patient's chronic microcytic hyperchromic anemia.  Remaining labs grossly unremarkable.  Ultrasound of pelvis reveals small uterine fibroid.  There is also appear to be a right ovarian cyst which is likely the etiology of patient's discomfort.  No signs of torsion at this time and low suspicion of acute surgical abdomen as patient did have some improvement symptoms in the ED with Toradol and Norco.  Low suspicion of any acute infectious process  as afebrile, well-appearing and no leukocytosis.  GC is pending with low suspicion as patient has had negative screened in the past and is followed by gyn with no reported new partners.  She will be sent home with anti-inflammatory and pain medication with instructions to follow up with her gyn for further evaluation. Strict instructions to follow up with primary care physician or reference provided for further assessment and evaluation. Given instructions to return for any acute symptoms and verbalized understanding of this medical plan.                        Clinical Impression:   The primary encounter diagnosis was Uterine leiomyoma, unspecified location. A diagnosis of Cyst of right ovary was also pertinent to this visit.                             SAKINA Felipe  10/16/18 [id number]

## 2018-10-12 ENCOUNTER — TELEPHONE (OUTPATIENT)
Dept: OBSTETRICS AND GYNECOLOGY | Facility: CLINIC | Age: 48
End: 2018-10-12

## 2018-10-12 ENCOUNTER — OFFICE VISIT (OUTPATIENT)
Dept: OBSTETRICS AND GYNECOLOGY | Facility: CLINIC | Age: 48
End: 2018-10-12
Payer: MEDICAID

## 2018-10-12 VITALS
BODY MASS INDEX: 29.84 KG/M2 | WEIGHT: 168.44 LBS | DIASTOLIC BLOOD PRESSURE: 76 MMHG | SYSTOLIC BLOOD PRESSURE: 120 MMHG | HEIGHT: 63 IN

## 2018-10-12 DIAGNOSIS — Z01.818 PREOP EXAMINATION: Primary | ICD-10-CM

## 2018-10-12 DIAGNOSIS — D25.9 UTERINE LEIOMYOMA, UNSPECIFIED LOCATION: ICD-10-CM

## 2018-10-12 DIAGNOSIS — D25.1 FIBROIDS, INTRAMURAL: ICD-10-CM

## 2018-10-12 DIAGNOSIS — R87.613 HSIL (HIGH GRADE SQUAMOUS INTRAEPITHELIAL LESION) ON PAP SMEAR OF CERVIX: ICD-10-CM

## 2018-10-12 PROBLEM — N92.4 PERIMENOPAUSAL MENORRHAGIA: Status: RESOLVED | Noted: 2017-07-12 | Resolved: 2018-10-12

## 2018-10-12 PROBLEM — N93.8 DYSFUNCTIONAL UTERINE BLEEDING: Status: RESOLVED | Noted: 2018-07-10 | Resolved: 2018-10-12

## 2018-10-12 PROBLEM — D64.9 SYMPTOMATIC ANEMIA: Status: RESOLVED | Noted: 2017-07-12 | Resolved: 2018-10-12

## 2018-10-12 LAB
C TRACH DNA SPEC QL NAA+PROBE: NOT DETECTED
N GONORRHOEA DNA SPEC QL NAA+PROBE: NOT DETECTED

## 2018-10-12 PROCEDURE — 99999 PR PBB SHADOW E&M-EST. PATIENT-LVL III: CPT | Mod: PBBFAC,,, | Performed by: OBSTETRICS & GYNECOLOGY

## 2018-10-12 PROCEDURE — 99213 OFFICE O/P EST LOW 20 MIN: CPT | Mod: S$PBB,,, | Performed by: OBSTETRICS & GYNECOLOGY

## 2018-10-12 PROCEDURE — 99213 OFFICE O/P EST LOW 20 MIN: CPT | Mod: PBBFAC,PO | Performed by: OBSTETRICS & GYNECOLOGY

## 2018-10-12 RX ORDER — MEDROXYPROGESTERONE ACETATE 10 MG/1
10 TABLET ORAL DAILY
Qty: 30 TABLET | Refills: 0 | Status: SHIPPED | OUTPATIENT
Start: 2018-10-12 | End: 2018-10-12

## 2018-10-12 RX ORDER — MEDROXYPROGESTERONE ACETATE 10 MG/1
10 TABLET ORAL DAILY
Qty: 30 TABLET | Refills: 0 | Status: ON HOLD | OUTPATIENT
Start: 2018-10-12 | End: 2018-10-16

## 2018-10-12 NOTE — TELEPHONE ENCOUNTER
Ed note reviewed. Pt scheduled to be seen in clinic. Surgery scheduled for 11/5/18    Naty Carrero MD, FACOG  OB/GYN  Pager: [phone]

## 2018-10-12 NOTE — H&P (VIEW-ONLY)
C.JASWANT Pre-op Exam      HPI : Lani Hurst is a 48 y.o. female  for preop appointment for TLH/unilateral Salpingectomy secondary to symptomatic fibroids with anemia, HSIL pap, and right ovarian cyst. Surgery scheduled for 10/16/18 rescheduled from 18. The pros, cons, risks, benefits and alternatives all laparoscopic surgery were discussed.  The potential for injury to bowel, bladder, blood vessel and ureter was discussed.  The possible need for a blood transfusion discussed.  The potential of recurrent disease or remnant ovary and need for more surgery was discussed meredith depending on pathology from cervix.  The possible need to do a repair of the ureter and/or bowel and the need to open the abdomen was discussed.  The patient was informed that if the abdomen needed to be opened, that the incision might be a midline not a Pfannenstiel incision. The patients's potential pathology was discussed and her theraputic options reviewed.  We discussed ovarian conservation vs BSO and the risks and benefits of each option, including prevention of osteoporosis and cardiac disease with conservation vs the /70 lifetime risk of ovarian cancer and possible need for future surgeries for benign ovarian masses if ovaries left in situ. The patient has opted to proceed with TLH/US. She indicated she understood the pros, cons and risks of the procedure and elected ot have the surgery.    Pelvic Ultrasound: 10/11/18  Uterus: Size: 10.7 x 3.2 x 5.3 cm  Masses: There is a solitary 2.3 cm intramural fibroid along the anterior aspect of the upper uterine segment/fundus.    Endometrium: Normal in this pre menopausal patient, measuring 2-3 mm.  Several small nabothian cysts noted.    Right ovary: Size: 5.3 x 3.8 x 3.5 cm  Appearance: Contains a 3.4 x 2.2 x 3.5 cm well-circumscribed nonvascular hypoechoic structure with low level internal echoes.  Vascular flow: Normal.    Left ovary: Removed.  No left adnexal mass  "seen.    Pathology:   2018 EMBX  ENDOMETRIUM, BIOPSY:  Benign secretory endometrium  Negative for hyperplasia or malignancy    Pap: HSIL pap- declined colpo. Understand possible risk of underlying cervical cancer and possible need for additional surgery pending final report.     Past Medical History:   Diagnosis Date    Anemia     Heart murmur     History of blood transfusion     Mitral valve prolapse     Ovarian torsion      Past Surgical History:   Procedure Laterality Date    APPENDECTOMY       SECTION      Left ovary and follopian tube removal       s/p ovarian torsion     Family History   Problem Relation Age of Onset    Prostate cancer Father     Breast cancer Neg Hx     Colon cancer Neg Hx     Ovarian cancer Neg Hx      Social History     Tobacco Use    Smoking status: Current Every Day Smoker     Packs/day: 0.50     Types: Cigarettes    Smokeless tobacco: Never Used   Substance Use Topics    Alcohol use: Yes     Comment: occasionally    Drug use: Yes     Types: Marijuana     OB History    Para Term  AB Living   3 2 1   1 1   SAB TAB Ectopic Multiple Live Births   1       1      # Outcome Date GA Lbr Maximilian/2nd Weight Sex Delivery Anes PTL Lv   3 Para     F CS-Unspec   BRAYAN   2 Term            1 SAB  16w0d                 /76   Ht 5' 3" (1.6 m)   Wt 76.4 kg (168 lb 6.9 oz)   BMI 29.84 kg/m²     ROS:  GENERAL: Feeling well overall.   SKIN: Denies rash or lesions.   HEAD: Denies head injury or headache.   NODES: Denies enlarged lymph nodes.   CHEST: Denies chest pain or shortness of breath.   CARDIOVASCULAR: Denies palpitations or left sided chest pain.   ABDOMEN:  + pain, No constipation, diarrhea, nausea, vomiting or rectal bleeding.   URINARY: No frequency, dysuria, hematuria, or burning on urination.  REPRODUCTIVE: See HPI.   BREASTS: Denies pain, lumps, or nipple discharge.   HEMATOLOGIC: No easy bruisability.  MUSCULOSKELETAL: Denies joint pain or swelling. "   NEUROLOGIC: Denies syncope or weakness.   PSYCHIATRIC: Denies depression, anxiety or mood swings.      PHYSICAL EXAM:  APPEARANCE: Well nourished, well developed, in no acute distress.  AFFECT: WNL, alert and oriented x 3  SKIN: No acne or hirsutism  NECK: Neck symmetric without masses or thyromegaly  NODES: No inguinal, cervical, axillary, or femoral lymph node enlargement  CHEST: Good respiratory effect, CTAB  ABDOMEN: Soft.  No tenderness or masses.  No hepatosplenomegaly.  No hernias.  PELVIC: deferred  EXTREMITIES: No edema.    ASSESSMENT & PLAN:  1. Preop examination  2. Uterine leiomyoma, unspecified location  -preop orders signed. preop appointment on Monday 10/15/18      I have discussed the risks, benefits, indications, and alternatives of the procedure in detail.  The patient verbalizes her understanding.  All questions answered.  Consents signed.  The patient agrees to proceed to proceed as planned: TLH/USO on 10/16/18.      Naty Carrero MD  OB/GYN  Pager: [phone]

## 2018-10-12 NOTE — TELEPHONE ENCOUNTER
----- Message from Rick Del Castillo sent at 10/12/2018  2:10 PM CDT -----  Contact: [facility]/[phone]  Pharmacy needs clarification on the directions and the quantity of the medication listed below.    medroxyPROGESTERone (PROVERA) 10 MG tablet    [facility] [address] [place], [place] - [address]

## 2018-10-12 NOTE — TELEPHONE ENCOUNTER
----- Message from Sienna Obregon sent at 10/12/2018  8:25 AM CDT -----  Contact: self/[phone]  She was seen in the er for ovarian cyst last night and she is requesting an appointment this morning.

## 2018-10-12 NOTE — PROGRESS NOTES
C.JASWANT Pre-op Exam      HPI : Lani Hurst is a 48 y.o. female  for preop appointment for TLH/unilateral Salpingectomy secondary to symptomatic fibroids with anemia, HSIL pap, and right ovarian cyst. Surgery scheduled for 10/16/18 rescheduled from 18. The pros, cons, risks, benefits and alternatives all laparoscopic surgery were discussed.  The potential for injury to bowel, bladder, blood vessel and ureter was discussed.  The possible need for a blood transfusion discussed.  The potential of recurrent disease or remnant ovary and need for more surgery was discussed meredith depending on pathology from cervix.  The possible need to do a repair of the ureter and/or bowel and the need to open the abdomen was discussed.  The patient was informed that if the abdomen needed to be opened, that the incision might be a midline not a Pfannenstiel incision. The patients's potential pathology was discussed and her theraputic options reviewed.  We discussed ovarian conservation vs BSO and the risks and benefits of each option, including prevention of osteoporosis and cardiac disease with conservation vs the /70 lifetime risk of ovarian cancer and possible need for future surgeries for benign ovarian masses if ovaries left in situ. The patient has opted to proceed with TLH/US. She indicated she understood the pros, cons and risks of the procedure and elected ot have the surgery.    Pelvic Ultrasound: 10/11/18  Uterus: Size: 10.7 x 3.2 x 5.3 cm  Masses: There is a solitary 2.3 cm intramural fibroid along the anterior aspect of the upper uterine segment/fundus.    Endometrium: Normal in this pre menopausal patient, measuring 2-3 mm.  Several small nabothian cysts noted.    Right ovary: Size: 5.3 x 3.8 x 3.5 cm  Appearance: Contains a 3.4 x 2.2 x 3.5 cm well-circumscribed nonvascular hypoechoic structure with low level internal echoes.  Vascular flow: Normal.    Left ovary: Removed.  No left adnexal mass  "seen.    Pathology:   2018 EMBX  ENDOMETRIUM, BIOPSY:  Benign secretory endometrium  Negative for hyperplasia or malignancy    Pap: HSIL pap- declined colpo. Understand possible risk of underlying cervical cancer and possible need for additional surgery pending final report.     Past Medical History:   Diagnosis Date    Anemia     Heart murmur     History of blood transfusion     Mitral valve prolapse     Ovarian torsion      Past Surgical History:   Procedure Laterality Date    APPENDECTOMY       SECTION      Left ovary and follopian tube removal       s/p ovarian torsion     Family History   Problem Relation Age of Onset    Prostate cancer Father     Breast cancer Neg Hx     Colon cancer Neg Hx     Ovarian cancer Neg Hx      Social History     Tobacco Use    Smoking status: Current Every Day Smoker     Packs/day: 0.50     Types: Cigarettes    Smokeless tobacco: Never Used   Substance Use Topics    Alcohol use: Yes     Comment: occasionally    Drug use: Yes     Types: Marijuana     OB History    Para Term  AB Living   3 2 1   1 1   SAB TAB Ectopic Multiple Live Births   1       1      # Outcome Date GA Lbr Maximilian/2nd Weight Sex Delivery Anes PTL Lv   3 Para     F CS-Unspec   BRAYAN   2 Term            1 SAB  16w0d                 /76   Ht 5' 3" (1.6 m)   Wt 76.4 kg (168 lb 6.9 oz)   BMI 29.84 kg/m²     ROS:  GENERAL: Feeling well overall.   SKIN: Denies rash or lesions.   HEAD: Denies head injury or headache.   NODES: Denies enlarged lymph nodes.   CHEST: Denies chest pain or shortness of breath.   CARDIOVASCULAR: Denies palpitations or left sided chest pain.   ABDOMEN:  + pain, No constipation, diarrhea, nausea, vomiting or rectal bleeding.   URINARY: No frequency, dysuria, hematuria, or burning on urination.  REPRODUCTIVE: See HPI.   BREASTS: Denies pain, lumps, or nipple discharge.   HEMATOLOGIC: No easy bruisability.  MUSCULOSKELETAL: Denies joint pain or swelling. "   NEUROLOGIC: Denies syncope or weakness.   PSYCHIATRIC: Denies depression, anxiety or mood swings.      PHYSICAL EXAM:  APPEARANCE: Well nourished, well developed, in no acute distress.  AFFECT: WNL, alert and oriented x 3  SKIN: No acne or hirsutism  NECK: Neck symmetric without masses or thyromegaly  NODES: No inguinal, cervical, axillary, or femoral lymph node enlargement  CHEST: Good respiratory effect, CTAB  ABDOMEN: Soft.  No tenderness or masses.  No hepatosplenomegaly.  No hernias.  PELVIC: deferred  EXTREMITIES: No edema.    ASSESSMENT & PLAN:  1. Preop examination  2. Uterine leiomyoma, unspecified location  -preop orders signed. preop appointment on Monday 10/15/18      I have discussed the risks, benefits, indications, and alternatives of the procedure in detail.  The patient verbalizes her understanding.  All questions answered.  Consents signed.  The patient agrees to proceed to proceed as planned: TLH/USO on 10/16/18.      Naty Carrero MD  OB/GYN  Pager: [phone]

## 2018-10-15 ENCOUNTER — HOSPITAL ENCOUNTER (OUTPATIENT)
Dept: PREADMISSION TESTING | Facility: HOSPITAL | Age: 48
Discharge: HOME | End: 2018-10-15
Attending: OBSTETRICS & GYNECOLOGY
Payer: MEDICAID

## 2018-10-15 ENCOUNTER — ANESTHESIA EVENT (OUTPATIENT)
Dept: SURGERY | Facility: HOSPITAL | Age: 48
End: 2018-10-15
Payer: MEDICAID

## 2018-10-15 VITALS
WEIGHT: 170 LBS | RESPIRATION RATE: 16 BRPM | HEART RATE: 61 BPM | BODY MASS INDEX: 30.12 KG/M2 | SYSTOLIC BLOOD PRESSURE: 125 MMHG | DIASTOLIC BLOOD PRESSURE: 72 MMHG | HEIGHT: 63 IN | OXYGEN SATURATION: 98 %

## 2018-10-15 DIAGNOSIS — D25.9 UTERINE LEIOMYOMA, UNSPECIFIED LOCATION: ICD-10-CM

## 2018-10-15 DIAGNOSIS — R87.613 HSIL (HIGH GRADE SQUAMOUS INTRAEPITHELIAL LESION) ON PAP SMEAR OF CERVIX: Primary | ICD-10-CM

## 2018-10-15 RX ORDER — SODIUM CHLORIDE, SODIUM LACTATE, POTASSIUM CHLORIDE, CALCIUM CHLORIDE 600; 310; 30; 20 MG/100ML; MG/100ML; MG/100ML; MG/100ML
INJECTION, SOLUTION INTRAVENOUS CONTINUOUS
Status: CANCELLED | OUTPATIENT
Start: 2018-10-15

## 2018-10-15 RX ORDER — LIDOCAINE HYDROCHLORIDE 10 MG/ML
1 INJECTION, SOLUTION EPIDURAL; INFILTRATION; INTRACAUDAL; PERINEURAL ONCE
Status: CANCELLED | OUTPATIENT
Start: 2018-10-15 | End: 2018-10-15

## 2018-10-15 NOTE — PRE-PROCEDURE INSTRUCTIONS
Rob Mcgraw - Significant other - [phone]    Allergies, medical, surgical, family and psychosocial histories reviewed with patient. Periop plan of care reviewed. Preop instructions given, including medications to take and to hold. Hibiclens soap and instructions on use given. Time allotted for questions to be addressed.  Patient verbalized understanding.

## 2018-10-15 NOTE — ANESTHESIA PREPROCEDURE EVALUATION
10/15/2018  Lani Hurst is a 48 y.o., female with fibroids. Here for laparoscopic total hysterectomy.    Review of patient's allergies indicates:   Allergen Reactions    Morphine Itching     Past Medical History:   Diagnosis Date    Anemia     Heart murmur     History of blood transfusion     Mitral valve prolapse     Ovarian torsion      Past Surgical History:   Procedure Laterality Date    APPENDECTOMY       SECTION      Left ovary and follopian tube removal       s/p ovarian torsion         Anesthesia Evaluation         Review of Systems  Anesthesia Hx:  No problems with previous Anesthesia Denies Hx of Anesthetic complications History of prior surgery of interest to airway management or planning:   Social:  Smoker, Social Alcohol Use 0.5 PPD x 30 years  THC   Hematology/Oncology:         -- Anemia: -- Transfusion: -- Transfusion Hx (no complications):    Cardiovascular:  Cardiovascular Normal Exercise tolerance: good MVP     Pulmonary:  Pulmonary Normal    Hepatic/GI:   GERD, well controlled    Musculoskeletal:  Musculoskeletal Normal    OB/GYN/PEDS:  Gestational diabetes   Neurological:  Neurology Normal    Endocrine:  Endocrine Normal    Dermatological:  Skin Normal    Psych:  Psychiatric Normal         Lab Results   Component Value Date    WBC 10.30 10/11/2018    HGB 9.8 (L) 10/11/2018    HCT 33.2 (L) 10/11/2018     (H) 10/11/2018    CHOL 175 09/10/2018    TRIG 60 09/10/2018    HDL 39 (L) 09/10/2018    ALT 20 10/11/2018    AST 26 10/11/2018     10/11/2018    K 3.7 10/11/2018     (H) 10/11/2018    CREATININE 0.9 10/11/2018    BUN 7 10/11/2018    CO2 21 (L) 10/11/2018    TSH 0.496 2014    INR 1.0 2014    HGBA1C 5.3 09/10/2018     EK18:  Normal sinus rhythm  Normal ECG  When compared with ECG of 2017 15:43,  No significant change was  found  Confirmed by Jocelny Palumbo MD ([id number]) on 9/4/2018 5:08:57 PM    Referred By: MEG FRYE           Confirmed By:Jocelyn Palumbo MD      Physical Exam  General:  Well nourished, Obesity    Airway/Jaw/Neck:  Airway Findings: Mouth Opening: Normal Tongue: Normal  General Airway Assessment: Adult  Mallampati: II  Improves to I with phonation.  TM Distance: 4 - 6 cm  Jaw/Neck Findings:     Eyes/Ears/Nose:  EYES/EARS/NOSE FINDINGS: Normal   Dental:  Dental Findings:    Chest/Lungs:  Chest/Lungs Clear    Heart/Vascular:  Heart Findings: Normal Heart murmur: negative (Cannot appreciate murmur)       Mental Status:  Mental Status Findings: Normal        Anesthesia Plan  Type of Anesthesia, risks & benefits discussed:  Anesthesia Type:  general  Patient's Preference:   Intra-op Monitoring Plan: standard ASA monitors  Intra-op Monitoring Plan Comments:   Post Op Pain Control Plan: multimodal analgesia  Post Op Pain Control Plan Comments:   Induction:   IV  Beta Blocker:         Informed Consent: Patient understands risks and agrees with Anesthesia plan.  Questions answered. Anesthesia consent signed with patient.  ASA Score: 2     Day of Surgery Review of History & Physical:            Ready For Surgery From Anesthesia Perspective.

## 2018-10-15 NOTE — DISCHARGE INSTRUCTIONS
Your surgery is scheduled for 10/15/18.    Please report to Outpatient Surgery Intake Office on the 2nd FLOOR at 5:30 a.m.          INSTRUCTIONS IMPORTANT!!!  ¨ Do not eat or drink after 12 midnight-including water. OK to brush teeth, no   gum, candy or mints!        ____  Proceed to [facility] on 10/15/18 for additional blood test.        ____  Do not wear makeup, including mascara.  ____  No powder, lotions or creams to surgical area.  ____  Please remove all jewelry, including piercings and leave at home.  ____  No money or valuables needed. Please leave at home.  ____  Please bring any documents given by your doctor.  ____  If going home the same day, arrange for a ride home. You will not be able to             drive if Anesthesia was used.  ____  Wear loose fitting clothing. Allow for dressings, bandages.  ____  Stop Aspirin, Ibuprofen, Motrin and Aleve at least 3-5 days before surgery, unless otherwise instructed by your doctor, or the nurse.   You MAY use Tylenol/acetaminophen until day of surgery.  ____  Wash the surgical area with Hibiclens the night before surgery, and again the             morning of surgery.  Be sure to rinse hibiclens off completely (if instructed by   nurse).  ____  If you take diabetic medication, do not take am of surgery unless instructed by Doctor.  ____  Call MD for temperature above 101 degrees or any other signs of infection such as Urinary (bladder) infection, Upper respiratory infection, skin boils, etc.  ____ Stop taking any Fish Oil supplement or any Vitamins that contain Vitamin E at least 5 days prior to surgery.  ____ Do Not wear your contact lenses the day of your procedure.  You may wear your glasses.      ____Do not shave for 3 days prior to surgery.  ____ Practice Good hand washing before, during, and after procedure.      I have read or had read and explained to me, and understand the above information.  Additional comments or instructions:  For  additional questions call [phone]      Pre-Op Bathing Instructions    Before surgery, you can play an important role in your own health.    Because skin is not sterile, we need to be sure that your skin is as free of germs as possible. By following the instructions below, you can reduce the number of germs on your skin before surgery.    IMPORTANT: You will need to shower with a special soap called Hibiclens*, available at any pharmacy.  If you are allergic to Chlorhexidine (the antiseptic in Hibiclens), use an antibacterial soap such as Dial Soap for your preoperative shower.  You will shower with Hibiclens both the night before your surgery and the morning of your surgery.  Do not use Hibiclens on the head, face or genitals to avoid injury to those areas.    STEP #1: THE NIGHT BEFORE YOUR SURGERY     1. Do not shave the area of your body where your surgery will be performed.  2. Shower and wash your hair and body as usual with your normal soap and shampoo.  3. Rinse your hair and body thoroughly after you shower to remove all soap residue.  4. With your hand, apply one packet of Hibiclens soap to the surgical site.   5. Wash the site gently for five (5) minutes. Do not scrub your skin too hard.   6. Do not wash with your regular soap after Hibiclens is used.  7. Rinse your body thoroughly.  8. Pat yourself dry with a clean, soft towel.  9. Do not use lotion, cream, or powder.  10. Wear clean clothes.    STEP #2: THE MORNING OF YOUR SURGERY     1. Repeat Step #1.    * Not to be used by people allergic to Chlorhexidine.        [facility] Pre-Op Instructions (Hysterectomy)  Getting Ready for Surgery: What You Need to Know!  You are scheduled for a (surgical procedure) on (date).    Please arrive at (time)  The Day Before Surgery   Have someone drive you to the hospital or surgery center  o You cannot drive for at least 24 hours.  Please arrange for someone to drive you home after surgery.     Look over your  medication with your doctor  o Certain medications such as Aspirin, blood thinners, and herbal medications, such as [place]s Wort must be stopped up to 7 days before surgery.  Make sure your doctors are aware of ALL medications that you take including over-the-counter and herbal medications.  Some medications will need to be taken the morning of surgery with a sip of water.     Remove nail polish and/or artificial nails before the surgery  o During surgery, we will need to track your vitals with a monitor that will be placed on your finger. The monitor will not work properly if you are wearing nail polish or artificial nails   Do not eat AFTER 9 pm the day before your surgery  o In general, you can eat your normal foods the day before surgery.  However, you should not eat or drink after 9 pm the day before your surgery unless otherwise instructed by your doctor.     Do your part to prevent a wound infection  o Do not shave the area of your body where surgery will be performed 5 DAYS prior to your surgery.  When you shave, tiny cuts can happen and allow bacteria to enter into the cuts  o Do shower the evening before and morning of procedure with antibacterial soap.  o Bring clean clothing to wear home after your surgery.  o Stop smoking.  Smoking cessation can prevent wound infections.  If you currently smoke and are interested in quitting, we can provide resources for you.  The Morning of Surgery...  o Do not use lotions, creams or deodorant.  o Do not wear jewelry, makeup, hair clips or contact lenses.  Remember to remove all piercings.  If you wear glasses, dentures, or hearing aids, please bring a container to place them in during your surgery and give to a family member for safekeeping.    If you have questions prior to surgery, please contact your doctors office or the pre-op clinic at [phone].

## 2018-10-16 ENCOUNTER — ANESTHESIA (OUTPATIENT)
Dept: SURGERY | Facility: HOSPITAL | Age: 48
End: 2018-10-16
Payer: MEDICAID

## 2018-10-16 ENCOUNTER — HOSPITAL ENCOUNTER (OUTPATIENT)
Facility: HOSPITAL | Age: 48
Discharge: HOME | End: 2018-10-17
Attending: OBSTETRICS & GYNECOLOGY | Admitting: OBSTETRICS & GYNECOLOGY
Payer: MEDICAID

## 2018-10-16 DIAGNOSIS — D25.1 FIBROIDS, INTRAMURAL: ICD-10-CM

## 2018-10-16 DIAGNOSIS — D25.9 UTERINE LEIOMYOMA, UNSPECIFIED LOCATION: Primary | ICD-10-CM

## 2018-10-16 LAB — POCT GLUCOSE: 116 MG/DL (ref 70–110)

## 2018-10-16 PROCEDURE — 36000710: Performed by: OBSTETRICS & GYNECOLOGY

## 2018-10-16 PROCEDURE — 00840 ANES IPER PX LOWER ABD NOS: CPT | Performed by: OBSTETRICS & GYNECOLOGY

## 2018-10-16 PROCEDURE — 37000008 HC ANESTHESIA 1ST 15 MINUTES: Performed by: OBSTETRICS & GYNECOLOGY

## 2018-10-16 PROCEDURE — 94799 UNLISTED PULMONARY SVC/PX: CPT

## 2018-10-16 PROCEDURE — 25000003 PHARM REV CODE 250: Performed by: NURSE ANESTHETIST, CERTIFIED REGISTERED

## 2018-10-16 PROCEDURE — 63600175 PHARM REV CODE 636 W HCPCS: Performed by: OBSTETRICS & GYNECOLOGY

## 2018-10-16 PROCEDURE — 94761 N-INVAS EAR/PLS OXIMETRY MLT: CPT | Mod: 59

## 2018-10-16 PROCEDURE — 58571 TLH W/T/O 250 G OR LESS: CPT | Mod: 80,,, | Performed by: OBSTETRICS & GYNECOLOGY

## 2018-10-16 PROCEDURE — 71000039 HC RECOVERY, EACH ADD'L HOUR: Performed by: OBSTETRICS & GYNECOLOGY

## 2018-10-16 PROCEDURE — 27201423 OPTIME MED/SURG SUP & DEVICES STERILE SUPPLY: Performed by: OBSTETRICS & GYNECOLOGY

## 2018-10-16 PROCEDURE — 88307 TISSUE EXAM BY PATHOLOGIST: CPT | Performed by: PATHOLOGY

## 2018-10-16 PROCEDURE — 63600175 PHARM REV CODE 636 W HCPCS: Performed by: NURSE ANESTHETIST, CERTIFIED REGISTERED

## 2018-10-16 PROCEDURE — 63600175 PHARM REV CODE 636 W HCPCS: Performed by: ANESTHESIOLOGY

## 2018-10-16 PROCEDURE — 37000009 HC ANESTHESIA EA ADD 15 MINS: Performed by: OBSTETRICS & GYNECOLOGY

## 2018-10-16 PROCEDURE — 88307 TISSUE EXAM BY PATHOLOGIST: CPT | Mod: 26,,, | Performed by: PATHOLOGY

## 2018-10-16 PROCEDURE — 58571 TLH W/T/O 250 G OR LESS: CPT | Mod: ,,, | Performed by: OBSTETRICS & GYNECOLOGY

## 2018-10-16 PROCEDURE — C2628 CATHETER, OCCLUSION: HCPCS | Performed by: OBSTETRICS & GYNECOLOGY

## 2018-10-16 PROCEDURE — 71000033 HC RECOVERY, INTIAL HOUR: Performed by: OBSTETRICS & GYNECOLOGY

## 2018-10-16 PROCEDURE — 25000003 PHARM REV CODE 250: Performed by: OBSTETRICS & GYNECOLOGY

## 2018-10-16 PROCEDURE — 36000711: Performed by: OBSTETRICS & GYNECOLOGY

## 2018-10-16 RX ORDER — SODIUM CHLORIDE, SODIUM LACTATE, POTASSIUM CHLORIDE, CALCIUM CHLORIDE 600; 310; 30; 20 MG/100ML; MG/100ML; MG/100ML; MG/100ML
INJECTION, SOLUTION INTRAVENOUS CONTINUOUS PRN
Status: DISCONTINUED | OUTPATIENT
Start: 2018-10-16 | End: 2018-10-16

## 2018-10-16 RX ORDER — DIPHENHYDRAMINE HYDROCHLORIDE 50 MG/ML
12.5 INJECTION INTRAMUSCULAR; INTRAVENOUS EVERY 4 HOURS PRN
Status: DISCONTINUED | OUTPATIENT
Start: 2018-10-16 | End: 2018-10-17 | Stop reason: HOSPADM

## 2018-10-16 RX ORDER — ONDANSETRON 2 MG/ML
4 INJECTION INTRAMUSCULAR; INTRAVENOUS EVERY 12 HOURS PRN
Status: DISCONTINUED | OUTPATIENT
Start: 2018-10-16 | End: 2018-10-17 | Stop reason: HOSPADM

## 2018-10-16 RX ORDER — ROCURONIUM BROMIDE 10 MG/ML
INJECTION, SOLUTION INTRAVENOUS
Status: DISCONTINUED | OUTPATIENT
Start: 2018-10-16 | End: 2018-10-16

## 2018-10-16 RX ORDER — HYDROMORPHONE HYDROCHLORIDE 1 MG/ML
0.5 INJECTION, SOLUTION INTRAMUSCULAR; INTRAVENOUS; SUBCUTANEOUS
Status: DISCONTINUED | OUTPATIENT
Start: 2018-10-16 | End: 2018-10-17 | Stop reason: HOSPADM

## 2018-10-16 RX ORDER — DIPHENHYDRAMINE HYDROCHLORIDE 50 MG/ML
25 INJECTION INTRAMUSCULAR; INTRAVENOUS EVERY 6 HOURS PRN
Status: DISCONTINUED | OUTPATIENT
Start: 2018-10-16 | End: 2018-10-16 | Stop reason: HOSPADM

## 2018-10-16 RX ORDER — HYDROMORPHONE HCL 2 MG/ML
VIAL (ML) INJECTION
Status: DISCONTINUED | OUTPATIENT
Start: 2018-10-16 | End: 2018-10-16

## 2018-10-16 RX ORDER — FENTANYL CITRATE 50 UG/ML
INJECTION, SOLUTION INTRAMUSCULAR; INTRAVENOUS
Status: DISCONTINUED | OUTPATIENT
Start: 2018-10-16 | End: 2018-10-16

## 2018-10-16 RX ORDER — ONDANSETRON HYDROCHLORIDE 2 MG/ML
INJECTION, SOLUTION INTRAMUSCULAR; INTRAVENOUS
Status: DISCONTINUED | OUTPATIENT
Start: 2018-10-16 | End: 2018-10-16

## 2018-10-16 RX ORDER — NEOSTIGMINE METHYLSULFATE 1 MG/ML
INJECTION, SOLUTION INTRAVENOUS
Status: DISCONTINUED | OUTPATIENT
Start: 2018-10-16 | End: 2018-10-16

## 2018-10-16 RX ORDER — KETOROLAC TROMETHAMINE 30 MG/ML
INJECTION, SOLUTION INTRAMUSCULAR; INTRAVENOUS
Status: DISCONTINUED | OUTPATIENT
Start: 2018-10-16 | End: 2018-10-16

## 2018-10-16 RX ORDER — CEFAZOLIN SODIUM 2 G/50ML
2 SOLUTION INTRAVENOUS
Status: COMPLETED | OUTPATIENT
Start: 2018-10-16 | End: 2018-10-16

## 2018-10-16 RX ORDER — PROPOFOL 10 MG/ML
VIAL (ML) INTRAVENOUS
Status: DISCONTINUED | OUTPATIENT
Start: 2018-10-16 | End: 2018-10-16

## 2018-10-16 RX ORDER — OXYCODONE AND ACETAMINOPHEN 5; 325 MG/1; MG/1
1 TABLET ORAL EVERY 4 HOURS PRN
Status: DISCONTINUED | OUTPATIENT
Start: 2018-10-16 | End: 2018-10-17 | Stop reason: HOSPADM

## 2018-10-16 RX ORDER — GLYCOPYRROLATE 0.2 MG/ML
INJECTION INTRAMUSCULAR; INTRAVENOUS
Status: DISCONTINUED | OUTPATIENT
Start: 2018-10-16 | End: 2018-10-16

## 2018-10-16 RX ORDER — OXYCODONE AND ACETAMINOPHEN 10; 325 MG/1; MG/1
1 TABLET ORAL EVERY 4 HOURS PRN
Status: DISCONTINUED | OUTPATIENT
Start: 2018-10-16 | End: 2018-10-17 | Stop reason: HOSPADM

## 2018-10-16 RX ORDER — SODIUM CHLORIDE, SODIUM LACTATE, POTASSIUM CHLORIDE, CALCIUM CHLORIDE 600; 310; 30; 20 MG/100ML; MG/100ML; MG/100ML; MG/100ML
INJECTION, SOLUTION INTRAVENOUS CONTINUOUS
Status: DISCONTINUED | OUTPATIENT
Start: 2018-10-16 | End: 2018-10-16

## 2018-10-16 RX ORDER — DEXAMETHASONE SODIUM PHOSPHATE 4 MG/ML
INJECTION, SOLUTION INTRA-ARTICULAR; INTRALESIONAL; INTRAMUSCULAR; INTRAVENOUS; SOFT TISSUE
Status: DISCONTINUED | OUTPATIENT
Start: 2018-10-16 | End: 2018-10-16

## 2018-10-16 RX ORDER — SODIUM CHLORIDE 0.9 % (FLUSH) 0.9 %
3 SYRINGE (ML) INJECTION EVERY 8 HOURS
Status: DISCONTINUED | OUTPATIENT
Start: 2018-10-16 | End: 2018-10-16 | Stop reason: HOSPADM

## 2018-10-16 RX ORDER — SODIUM CHLORIDE 0.9 % (FLUSH) 0.9 %
3 SYRINGE (ML) INJECTION
Status: DISCONTINUED | OUTPATIENT
Start: 2018-10-16 | End: 2018-10-16

## 2018-10-16 RX ORDER — ACETAMINOPHEN 10 MG/ML
INJECTION, SOLUTION INTRAVENOUS
Status: DISCONTINUED | OUTPATIENT
Start: 2018-10-16 | End: 2018-10-16

## 2018-10-16 RX ORDER — HYDROMORPHONE HYDROCHLORIDE 2 MG/ML
0.5 INJECTION, SOLUTION INTRAMUSCULAR; INTRAVENOUS; SUBCUTANEOUS EVERY 5 MIN PRN
Status: DISCONTINUED | OUTPATIENT
Start: 2018-10-16 | End: 2018-10-16 | Stop reason: HOSPADM

## 2018-10-16 RX ORDER — DIPHENHYDRAMINE HCL 25 MG
25 CAPSULE ORAL EVERY 6 HOURS PRN
Status: DISCONTINUED | OUTPATIENT
Start: 2018-10-17 | End: 2018-10-17 | Stop reason: HOSPADM

## 2018-10-16 RX ORDER — ONDANSETRON 2 MG/ML
4 INJECTION INTRAMUSCULAR; INTRAVENOUS DAILY PRN
Status: DISCONTINUED | OUTPATIENT
Start: 2018-10-16 | End: 2018-10-16 | Stop reason: HOSPADM

## 2018-10-16 RX ORDER — IBUPROFEN 600 MG/1
600 TABLET ORAL EVERY 6 HOURS PRN
Status: DISCONTINUED | OUTPATIENT
Start: 2018-10-16 | End: 2018-10-17 | Stop reason: HOSPADM

## 2018-10-16 RX ORDER — HYDROMORPHONE HYDROCHLORIDE 2 MG/ML
0.2 INJECTION, SOLUTION INTRAMUSCULAR; INTRAVENOUS; SUBCUTANEOUS EVERY 5 MIN PRN
Status: DISCONTINUED | OUTPATIENT
Start: 2018-10-16 | End: 2018-10-16 | Stop reason: HOSPADM

## 2018-10-16 RX ORDER — MIDAZOLAM HYDROCHLORIDE 1 MG/ML
INJECTION INTRAMUSCULAR; INTRAVENOUS
Status: DISCONTINUED | OUTPATIENT
Start: 2018-10-16 | End: 2018-10-16

## 2018-10-16 RX ORDER — SUCCINYLCHOLINE CHLORIDE 20 MG/ML
INJECTION INTRAMUSCULAR; INTRAVENOUS
Status: DISCONTINUED | OUTPATIENT
Start: 2018-10-16 | End: 2018-10-16

## 2018-10-16 RX ORDER — LIDOCAINE HYDROCHLORIDE 10 MG/ML
1 INJECTION, SOLUTION EPIDURAL; INFILTRATION; INTRACAUDAL; PERINEURAL ONCE
Status: DISCONTINUED | OUTPATIENT
Start: 2018-10-16 | End: 2018-10-16 | Stop reason: HOSPADM

## 2018-10-16 RX ORDER — LIDOCAINE HCL/PF 100 MG/5ML
SYRINGE (ML) INTRAVENOUS
Status: DISCONTINUED | OUTPATIENT
Start: 2018-10-16 | End: 2018-10-16

## 2018-10-16 RX ADMIN — HYDROMORPHONE HYDROCHLORIDE 0.5 MG: 2 INJECTION INTRAMUSCULAR; INTRAVENOUS; SUBCUTANEOUS at 10:10

## 2018-10-16 RX ADMIN — KETOROLAC TROMETHAMINE 30 MG: 30 INJECTION, SOLUTION INTRAMUSCULAR; INTRAVENOUS at 09:10

## 2018-10-16 RX ADMIN — ROCURONIUM BROMIDE 25 MG: 10 INJECTION, SOLUTION INTRAVENOUS at 07:10

## 2018-10-16 RX ADMIN — DIPHENHYDRAMINE HYDROCHLORIDE 12.5 MG: 50 INJECTION, SOLUTION INTRAMUSCULAR; INTRAVENOUS at 04:10

## 2018-10-16 RX ADMIN — DIPHENHYDRAMINE HYDROCHLORIDE 12.5 MG: 50 INJECTION, SOLUTION INTRAMUSCULAR; INTRAVENOUS at 12:10

## 2018-10-16 RX ADMIN — OXYCODONE HYDROCHLORIDE AND ACETAMINOPHEN 1 TABLET: 10; 325 TABLET ORAL at 11:10

## 2018-10-16 RX ADMIN — HYDROMORPHONE HYDROCHLORIDE 0.5 MG: 1 INJECTION, SOLUTION INTRAMUSCULAR; INTRAVENOUS; SUBCUTANEOUS at 12:10

## 2018-10-16 RX ADMIN — FENTANYL CITRATE 50 MCG: 50 INJECTION, SOLUTION INTRAMUSCULAR; INTRAVENOUS at 07:10

## 2018-10-16 RX ADMIN — SUCCINYLCHOLINE CHLORIDE 100 MG: 20 INJECTION, SOLUTION INTRAMUSCULAR; INTRAVENOUS at 07:10

## 2018-10-16 RX ADMIN — DIPHENHYDRAMINE HYDROCHLORIDE 12.5 MG: 50 INJECTION, SOLUTION INTRAMUSCULAR; INTRAVENOUS at 08:10

## 2018-10-16 RX ADMIN — ROCURONIUM BROMIDE 5 MG: 10 INJECTION, SOLUTION INTRAVENOUS at 07:10

## 2018-10-16 RX ADMIN — CEFAZOLIN SODIUM 2 G: 2 SOLUTION INTRAVENOUS at 07:10

## 2018-10-16 RX ADMIN — MIDAZOLAM HYDROCHLORIDE 2 MG: 1 INJECTION, SOLUTION INTRAMUSCULAR; INTRAVENOUS at 06:10

## 2018-10-16 RX ADMIN — LIDOCAINE HYDROCHLORIDE 80 MG: 20 INJECTION, SOLUTION INTRAVENOUS at 07:10

## 2018-10-16 RX ADMIN — ROCURONIUM BROMIDE 20 MG: 10 INJECTION, SOLUTION INTRAVENOUS at 07:10

## 2018-10-16 RX ADMIN — HYDROMORPHONE HYDROCHLORIDE 0.2 MG: 2 INJECTION INTRAMUSCULAR; INTRAVENOUS; SUBCUTANEOUS at 09:10

## 2018-10-16 RX ADMIN — IBUPROFEN 600 MG: 600 TABLET, FILM COATED ORAL at 10:10

## 2018-10-16 RX ADMIN — HYDROMORPHONE HYDROCHLORIDE 0.4 MG: 2 INJECTION INTRAMUSCULAR; INTRAVENOUS; SUBCUTANEOUS at 09:10

## 2018-10-16 RX ADMIN — ONDANSETRON 8 MG: 2 INJECTION, SOLUTION INTRAMUSCULAR; INTRAVENOUS at 09:10

## 2018-10-16 RX ADMIN — SODIUM CHLORIDE, SODIUM LACTATE, POTASSIUM CHLORIDE, AND CALCIUM CHLORIDE: .6; .31; .03; .02 INJECTION, SOLUTION INTRAVENOUS at 06:10

## 2018-10-16 RX ADMIN — NEOSTIGMINE METHYLSULFATE 4 MG: 1 INJECTION INTRAVENOUS at 09:10

## 2018-10-16 RX ADMIN — GLYCOPYRROLATE 0.6 MG: 0.2 INJECTION, SOLUTION INTRAMUSCULAR; INTRAVENOUS at 09:10

## 2018-10-16 RX ADMIN — SODIUM CHLORIDE, SODIUM LACTATE, POTASSIUM CHLORIDE, AND CALCIUM CHLORIDE: .6; .31; .03; .02 INJECTION, SOLUTION INTRAVENOUS at 04:10

## 2018-10-16 RX ADMIN — ACETAMINOPHEN 1000 MG: 10 INJECTION, SOLUTION INTRAVENOUS at 07:10

## 2018-10-16 RX ADMIN — IBUPROFEN 600 MG: 600 TABLET, FILM COATED ORAL at 04:10

## 2018-10-16 RX ADMIN — OXYCODONE HYDROCHLORIDE AND ACETAMINOPHEN 1 TABLET: 10; 325 TABLET ORAL at 07:10

## 2018-10-16 RX ADMIN — DEXAMETHASONE SODIUM PHOSPHATE 8 MG: 4 INJECTION, SOLUTION INTRAMUSCULAR; INTRAVENOUS at 08:10

## 2018-10-16 RX ADMIN — FENTANYL CITRATE 100 MCG: 50 INJECTION, SOLUTION INTRAMUSCULAR; INTRAVENOUS at 07:10

## 2018-10-16 RX ADMIN — PROPOFOL 50 MG: 10 INJECTION, EMULSION INTRAVENOUS at 07:10

## 2018-10-16 RX ADMIN — OXYCODONE HYDROCHLORIDE AND ACETAMINOPHEN 1 TABLET: 10; 325 TABLET ORAL at 03:10

## 2018-10-16 RX ADMIN — PROPOFOL 150 MG: 10 INJECTION, EMULSION INTRAVENOUS at 07:10

## 2018-10-16 NOTE — TRANSFER OF CARE
"Anesthesia Transfer of Care Note    Patient: Lani Hurst    Procedure(s) Performed: Procedure(s) (LRB):  HYSTERECTOMY, TOTAL, LAPAROSCOPIC (N/A)    Patient location: PACU    Anesthesia Type: general    Transport from OR: Transported from OR on 6-10 L/min O2 by face mask with adequate spontaneous ventilation    Post pain: adequate analgesia    Post assessment: no apparent anesthetic complications and tolerated procedure well    Post vital signs: stable    Level of consciousness: awake    Nausea/Vomiting: no nausea/vomiting    Complications: none    Transfer of care protocol was followed      Last vitals:   Visit Vitals  /77 (BP Location: Right arm, Patient Position: Lying)   Pulse (!) 57   Temp 36.8 °C (98.2 °F) (Temporal)   Resp 20   Ht 5' 3" (1.6 m)   Wt 75.8 kg (167 lb)   LMP 08/24/2018   SpO2 99%   BMI 29.58 kg/m²     "

## 2018-10-16 NOTE — OP NOTE
OPERATIVE REPORT    DATE OF PROCEDURE: 10/16/18    PREOPERATIVE DIAGNOSIS:   1. Symptomatic uterine fibroids  2. Right ovarian cyst  3. HSIL pap- declined colposcopy    POSTOPERATIVE DIAGNOSIS:   1. Symptomatic uterine fibroids  2. Right ovarian cyst  3. HSIL pap- declined colposcopy  4. Adhesive disease    PROCEDURE:   1. Total laparoscopic hysterectomy  2. Right salpingoophorectomy    SURGEON: DAMIEN Carrero MD    ASSISTANT: CIARRA Severino MD    SECOND ASSISTANT: Francisca Samson     ESTIMATED BLOOD LOSS: 50 mL     INTRAVENOUS FLUIDS: 1200 mL     URINE OUTPUT: 1625 mL    SPECIMEN: uterus, cervix, right fallopian tube and ovary    OPERATIVE FINDINGS: On laparoscopy, enlarged fibroid uterus, left adnexa surgically removed. Right ovarian cyst. Adhesions of omentum to anterior abdominal wall, right ovary with adhesions to small bowel, filmy adhesions of uterus to posterior cul-de-sac.     PROCEDURE IN DETAIL:   The patient was taken to the Operating Room where general anesthesia was achieved. She was prepped and draped in normal sterile fashion and placed in [place] stirrups. A weighted speculum was placed in the posterior fornix of the vagina and a right angle retractor was used to visualize the cervix. The cervix was grasped with a single-tooth tenaculum. Cervix sounded to 13 cm. Stay sutures were placed in the anterior and posterior lips of the cervix with Vicryl suture.  Cervix was serially dilated. A size 10 JULITO tip with a  Small cup was assembled and introduced into the cavity. The uterine balloon and the vaginal occluder balloon were then insufflated.     Gloves were then changed. Attention was then turned to the abdomen where a small 1 cm infraumbilical skin incision was made with the scalpel. Veress needle was introduced into the abdomen through the umbilicus. Confirmation into the intraabdominal cavity was confirmed with a water drop test. The abdomen was then insufflated to 15 mmHg with CO2 gas. An 10 mm port was  then placed at the umbilical site under direct visualization. The camera was used to inspect the abdomen. The patient was placed in trendelenburg. Two 5 mm  lateral skin incisions were made with a scalpel and 5 mm trocars was advanced through theses incisions under visualization of the camera.     First some omental adhesions were taken down from the anterior abdominal wall to enhance visualization. The right ovarian adhesions were taken down using the monopolar scissors with careful attention to stay on the ovary. Secondarily small amount of remnant ovarian tissue left on the bowel to avoid bowel injury. Hemostasis was noted. The right IP was identified, grasped, coagulated and transected. Hemostasis noted. Next the right round ligament was grasped, cauterized, and transected. Dissection was carried anteriorly down through the broad ligament to create the bladder flap anteriorly. The right uterine was then cauterized but not cut. In a similar fashion the left round ligament were grasped, cauterized, and transected using the ligasure. The left uterine vessels were cauterized and transected. The remaining bladder flap was created and the anterior colpotomy was made. The colpotomy was carried around posteriorly until complete. The uterus, cervix, right ovary and fallopian tube was then removed from the vagina. Irrigation was performed. The cuff was then closed in figure of eight sutures with Vicryl suture using the Endostitch. Hemostasis was noted. Surgicel was placed at the vaginal cuff. The abdomen was again inspected and hemostatis was noted. The procedure was then completed. The abdomen was desufflated. All port sites were removed and port sites were closed with 4-0 Monocryl and dermabond. The patient tolerated the procedure well. Sponge, lap and needle counts were correct. She was taken to Recovery Room in stable condition.    Naty Carrero MD  OB/GYN  Pager: [phone]

## 2018-10-16 NOTE — INTERVAL H&P NOTE
The patient has been seen and the H&P has been reviewed: No interval changes since last clinic visit.  Proceed to OR for scheduled procedure: TLH/salpingectomy +/- LSO    Naty Carrero MD  OB/GYN  Pager: [phone]              Active Hospital Problems    Diagnosis  POA    *Fibroids, intramural [D25.1]  Yes    HSIL (high grade squamous intraepithelial lesion) on Pap smear of cervix [R87.613]  Yes    Anemia due to blood loss, chronic [D50.0]  Yes      Resolved Hospital Problems   No resolved problems to display.

## 2018-10-16 NOTE — PROGRESS NOTES
[facility]  Obstetrics & Gynecology  Progress Note    Patient Name: Lani Hurst  MRN: [id number]  Admission Date: 10/16/2018  Primary Care Provider: Primary Doctor No  Principal Problem: Fibroids, intramural    Subjective:     Interval History: POD#0 s/p TLH/RSO secondary to fibroids/ovarian cyst/adhesions. Patient doing . Denies CP/SOB/Nausea/Vomiting. Pain well controlled with IV pain meds. Chiang in place draining clear urine. Has not tolerating anything PO as of yet. Discussed intraoperative findings and need for HRT.    Scheduled Meds:  Continuous Infusions:   lactated ringers       PRN Meds:diphenhydrAMINE, HYDROmorphone, ibuprofen, ondansetron, oxyCODONE-acetaminophen, oxyCODONE-acetaminophen, promethazine (PHENERGAN) IVPB    Review of patient's allergies indicates:   Allergen Reactions    Morphine Itching       Objective:     Vital Signs (Most Recent):  Temp: 97.7 °F (36.5 °C) (10/16/18 1100)  Pulse: 76 (10/16/18 1100)  Resp: 18 (10/16/18 1100)  BP: 124/60 (10/16/18 1100)  SpO2: 100 % (10/16/18 1100) Vital Signs (24h Range):  Temp:  [97.6 °F (36.4 °C)-98.2 °F (36.8 °C)] 97.7 °F (36.5 °C)  Pulse:  [57-86] 76  Resp:  [13-20] 18  SpO2:  [99 %-100 %] 100 %  BP: (122-134)/(60-77) 124/60     Weight: 75.8 kg (167 lb)  Body mass index is 29.58 kg/m².  Patient's last menstrual period was 08/24/2018.    I&O (Last 24H):    Intake/Output Summary (Last 24 hours) at 10/16/2018 1215  Last data filed at 10/16/2018 1100  Gross per 24 hour   Intake 1200 ml   Output 2125 ml   Net -925 ml       Physical Exam:   Constitutional: She is oriented to person, place, and time. She appears well-developed and well-nourished. No distress.    HENT:   Head: Normocephalic and atraumatic.      Cardiovascular: Normal rate, regular rhythm and normal heart sounds.     Pulmonary/Chest: Effort normal and breath sounds normal. She has no wheezes. She has no rales.        Abdominal: Soft. Bowel sounds are normal. She exhibits  no distension.   Incisions- c/d/i             Musculoskeletal: Moves all extremeties. She exhibits no edema.       Neurological: She is alert and oriented to person, place, and time. She has normal reflexes.    Skin: Skin is warm and dry.    Psychiatric: She has a normal mood and affect.       Laboratory:  CBC: No results for input(s): WBC, RBC, HGB, HCT, PLT, MCV, MCH, MCHC in the last 48 hours. AM labs pending      Assessment/Plan:     Active Diagnoses:    Diagnosis Date Noted POA    PRINCIPAL PROBLEM:  Fibroids, intramural [D25.1] 10/16/2018 Yes    HSIL (high grade squamous intraepithelial lesion) on Pap smear of cervix [R87.613] 10/12/2018 Yes    Anemia due to blood loss, chronic [D50.0] 11/26/2014 Yes      Problems Resolved During this Admission:       1. Post op- pain meds, antiemetics prn. AM labs pending. D/C cho this afternoon. Ambulate prn    Naty Carrero MD  Obstetrics & Gynecology  [facility]

## 2018-10-16 NOTE — PLAN OF CARE
Problem: Patient Care Overview  Goal: Plan of Care Review  Outcome: Ongoing (interventions implemented as appropriate)  1130 - assumed care of pt.  Vss, nad, c/o abdominal discomfort, LR infusing, cho draining clear yellow urine.  Poc: bedrest, clears/advance diet as tolerated, IV fluids, pain management, continue to monitor.  Reviewed poc w/pt.  Pt verbalized understanding.    1730 - cho d/c'd.  LR infusing, po hydration encouraged.  Pt ambulated from room to L&D nurses' station with slow and steady gait.  Pt tolerated well.

## 2018-10-16 NOTE — PLAN OF CARE
"VSS. No changes noted. Denies pain or discomfort @ this time. "Ok to release to room", per Dr Alvarenga. Report called to pt's nurse BALDOMERO Douglas, with time allotted for questions. Family updated.   "

## 2018-10-16 NOTE — ANESTHESIA POSTPROCEDURE EVALUATION
"Anesthesia Post Evaluation    Patient: Lani Hurst    Procedure(s) Performed: Procedure(s) (LRB):  HYSTERECTOMY, TOTAL, LAPAROSCOPIC (N/A)    Final Anesthesia Type: general  Patient location during evaluation: PACU  Patient participation: Yes- Able to Participate  Level of consciousness: awake and alert  Post-procedure vital signs: reviewed and stable  Pain management: adequate  Airway patency: patent  PONV status at discharge: No PONV  Anesthetic complications: no      Cardiovascular status: blood pressure returned to baseline  Respiratory status: unassisted  Hydration status: euvolemic  Follow-up not needed.        Visit Vitals  /60   Pulse 76   Temp 36.5 °C (97.7 °F) (Temporal)   Resp 18   Ht 5' 3" (1.6 m)   Wt 75.8 kg (167 lb)   LMP 08/24/2018   SpO2 100%   BMI 29.58 kg/m²       Pain/Gautam Score: Pain Assessment Performed: Yes (10/16/2018 11:05 AM)  Presence of Pain: denies (10/16/2018 11:05 AM)  Pain Rating Prior to Med Admin: 6 (10/16/2018 10:56 AM)  Gautam Score: 10 (10/16/2018 11:05 AM)        "

## 2018-10-16 NOTE — PLAN OF CARE
Problem: Patient Care Overview  Goal: Plan of Care Review  Outcome: Ongoing (interventions implemented as appropriate)  Pt on RA with documented sats.  Patient does IS well.  Ready for self instruct.  Will continue to monitor.

## 2018-10-17 ENCOUNTER — TELEPHONE (OUTPATIENT)
Dept: OBSTETRICS AND GYNECOLOGY | Facility: CLINIC | Age: 48
End: 2018-10-17

## 2018-10-17 ENCOUNTER — CLINICAL SUPPORT (OUTPATIENT)
Dept: SMOKING CESSATION | Facility: CLINIC | Age: 48
End: 2018-10-17
Payer: COMMERCIAL

## 2018-10-17 VITALS
DIASTOLIC BLOOD PRESSURE: 65 MMHG | SYSTOLIC BLOOD PRESSURE: 121 MMHG | BODY MASS INDEX: 29.59 KG/M2 | WEIGHT: 167 LBS | RESPIRATION RATE: 18 BRPM | HEART RATE: 70 BPM | HEIGHT: 63 IN | OXYGEN SATURATION: 100 % | TEMPERATURE: 99 F

## 2018-10-17 DIAGNOSIS — F17.210 CIGARETTE SMOKER: Primary | ICD-10-CM

## 2018-10-17 LAB
ANISOCYTOSIS BLD QL SMEAR: SLIGHT
BASOPHILS # BLD AUTO: 0.01 K/UL
BASOPHILS NFR BLD: 0 %
DACRYOCYTES BLD QL SMEAR: ABNORMAL
DIFFERENTIAL METHOD: ABNORMAL
EOSINOPHIL # BLD AUTO: 0 K/UL
EOSINOPHIL NFR BLD: 0 %
ERYTHROCYTE [DISTWIDTH] IN BLOOD BY AUTOMATED COUNT: 19.1 %
HCT VFR BLD AUTO: 29 %
HGB BLD-MCNC: 8.5 G/DL
LYMPHOCYTES # BLD AUTO: 2.5 K/UL
LYMPHOCYTES NFR BLD: 12.3 %
MCH RBC QN AUTO: 20.4 PG
MCHC RBC AUTO-ENTMCNC: 29.3 G/DL
MCV RBC AUTO: 70 FL
MONOCYTES # BLD AUTO: 1.5 K/UL
MONOCYTES NFR BLD: 7.2 %
NEUTROPHILS # BLD AUTO: 16.3 K/UL
NEUTROPHILS NFR BLD: 80.5 %
OVALOCYTES BLD QL SMEAR: ABNORMAL
PLATELET # BLD AUTO: 476 K/UL
PMV BLD AUTO: 9.3 FL
POIKILOCYTOSIS BLD QL SMEAR: SLIGHT
RBC # BLD AUTO: 4.17 M/UL
TARGETS BLD QL SMEAR: ABNORMAL
WBC # BLD AUTO: 20.31 K/UL

## 2018-10-17 PROCEDURE — 85025 COMPLETE CBC W/AUTO DIFF WBC: CPT

## 2018-10-17 PROCEDURE — 94799 UNLISTED PULMONARY SVC/PX: CPT

## 2018-10-17 PROCEDURE — 36415 COLL VENOUS BLD VENIPUNCTURE: CPT

## 2018-10-17 PROCEDURE — 94761 N-INVAS EAR/PLS OXIMETRY MLT: CPT

## 2018-10-17 PROCEDURE — 99407 BEHAV CHNG SMOKING > 10 MIN: CPT | Mod: S$GLB,,,

## 2018-10-17 PROCEDURE — 63600175 PHARM REV CODE 636 W HCPCS: Performed by: OBSTETRICS & GYNECOLOGY

## 2018-10-17 PROCEDURE — 25000003 PHARM REV CODE 250: Performed by: OBSTETRICS & GYNECOLOGY

## 2018-10-17 RX ORDER — IBUPROFEN 600 MG/1
600 TABLET ORAL EVERY 6 HOURS PRN
Qty: 30 TABLET | Refills: 0 | Status: SHIPPED | OUTPATIENT
Start: 2018-10-17 | End: 2019-06-25 | Stop reason: CLARIF

## 2018-10-17 RX ORDER — OXYCODONE AND ACETAMINOPHEN 10; 325 MG/1; MG/1
1 TABLET ORAL EVERY 6 HOURS PRN
Qty: 20 TABLET | Refills: 0 | Status: SHIPPED | OUTPATIENT
Start: 2018-10-17 | End: 2019-06-25 | Stop reason: CLARIF

## 2018-10-17 RX ORDER — FERROUS SULFATE 325(65) MG
325 TABLET ORAL 2 TIMES DAILY
Qty: 60 TABLET | Refills: 2 | Status: SHIPPED | OUTPATIENT
Start: 2018-10-17 | End: 2019-06-25 | Stop reason: CLARIF

## 2018-10-17 RX ORDER — ESTRADIOL 0.05 MG/D
1 PATCH TRANSDERMAL WEEKLY
Qty: 4 PATCH | Refills: 11 | Status: SHIPPED | OUTPATIENT
Start: 2018-10-18 | End: 2019-09-13

## 2018-10-17 RX ADMIN — DIPHENHYDRAMINE HYDROCHLORIDE 25 MG: 25 CAPSULE ORAL at 12:10

## 2018-10-17 RX ADMIN — DIPHENHYDRAMINE HYDROCHLORIDE 12.5 MG: 50 INJECTION, SOLUTION INTRAMUSCULAR; INTRAVENOUS at 06:10

## 2018-10-17 RX ADMIN — OXYCODONE HYDROCHLORIDE AND ACETAMINOPHEN 1 TABLET: 10; 325 TABLET ORAL at 03:10

## 2018-10-17 RX ADMIN — IBUPROFEN 600 MG: 600 TABLET, FILM COATED ORAL at 06:10

## 2018-10-17 RX ADMIN — OXYCODONE HYDROCHLORIDE AND ACETAMINOPHEN 1 TABLET: 10; 325 TABLET ORAL at 09:10

## 2018-10-17 NOTE — PROGRESS NOTES
Individual Follow-Up Form    10/17/2018    Quit Date: To be determined    Clinical Status of Patient: Inpatient    Length of Service: 30 minutes    Comments: Patient declines transdermal nicotine patch but states that she is motivated to quit smoking.  Smoking cessation education and materials provided, and pt was enrolled in the Ambulatory Smoking Cessation program during this encounter.    Diagnosis: F17.210    Next Visit: Ambulatory referral to Smoking Cessation program

## 2018-10-17 NOTE — TELEPHONE ENCOUNTER
Contacted pt and got her scheduled for wk post op visit, pt scheduled for 10/31 at 10:15am, Patient verbalized understanding.

## 2018-10-17 NOTE — PLAN OF CARE
Problem: Patient Care Overview  Goal: Plan of Care Review  Outcome: Ongoing (interventions implemented as appropriate)  Pain controlled.  Ambulating in room and hallway independently without difficulty.  Tolerating reg diet, fluid intake adequate.  Denies shortness of breath, dizziness, or troubled breathing.  No s/sof infection.  Safety maintained.  WCTM.

## 2018-10-17 NOTE — DISCHARGE SUMMARY
[facility]  Obstetrics & Gynecology  Discharge Summary    Patient Name: Lani Hurst  MRN: [id number]  Admission Date: 10/16/2018  Hospital Length of Stay: 0 days  Discharge Date:  10/17/2018   Attending Physician: Naty Carrero MD   Discharging Provider: Naty Carrero MD  Primary Care Provider: Primary Doctor No    Hospital Course:   Pt is a 48 y.o. POD # 1 from TLH/RSO secondary to symptomatic uterine fibroid/right ovarian cyst/adhesive disease.  Surgery was uncomplicated. Please see operative report for further details. Patient was transferred to recovery then the floor in stable condition. On discharge day, patient's pain is well  controlled with oral pain medications. Pt is tolerating ambulation without SOB or CP, and PO diet without N/V.  Pt in stable condition and ready for discharge, instructed to continue pain medications, and to follow up in the clinic in 2 weeks with me for post operative appointment.    Procedure(s) (LRB):  HYSTERECTOMY, TOTAL, LAPAROSCOPIC (N/A)  SALPINGO-OOPHORECTOMY, LAPAROSCOPIC (Right)     Significant Diagnostic Studies: Labs:   CBC   Recent Labs   Lab  10/17/18   0422   WBC  20.31*   HGB  8.5*   HCT  29.0*   PLT  476*       Pending Diagnostic Studies:     None        Final Active Diagnoses:    Diagnosis Date Noted POA    PRINCIPAL PROBLEM:  Fibroids, intramural [D25.1] 10/16/2018 Yes    HSIL (high grade squamous intraepithelial lesion) on Pap smear of cervix [R87.613] 10/12/2018 Yes    Anemia due to blood loss, chronic [D50.0] 11/26/2014 Yes      Problems Resolved During this Admission:        Discharged Condition: good    Disposition: Home or Self Care    Follow Up:  Follow-up Information     Naty Carrero MD. Schedule an appointment as soon as possible for a visit in 2 weeks.    Specialties:  Obstetrics, Obstetrics and Gynecology  Why:  Postoperative visit  Contact information:  [address]  [address] [place] [id number] [phone]                  Patient Instructions:      Diet Adult Regular     Notify your health care provider if you experience any of the following:  temperature >100.4     Notify your health care provider if you experience any of the following:  persistent nausea and vomiting or diarrhea     Notify your health care provider if you experience any of the following:  severe uncontrolled pain     Notify your health care provider if you experience any of the following:  redness, tenderness, or signs of infection (pain, swelling, redness, odor or green/yellow discharge around incision site)     Notify your health care provider if you experience any of the following:  difficulty breathing or increased cough     Notify your health care provider if you experience any of the following:  severe persistent headache     Notify your health care provider if you experience any of the following:  worsening rash     Notify your health care provider if you experience any of the following:  persistent dizziness, light-headedness, or visual disturbances     Notify your health care provider if you experience any of the following:  increased confusion or weakness     Activity as tolerated     Medications:  Reconciled Home Medications:      Medication List      START taking these medications    estradiol 0.05 mg/24 hr td ptsw 0.05 mg/24 hr  Commonly known as:  VIVELLE-DOT  Place 1 patch onto the skin twice a week.     ferrous sulfate 325 (65 FE) MG EC tablet  Take 1 tablet (325 mg total) by mouth 2 (two) times daily.     ibuprofen 600 MG tablet  Commonly known as:  ADVIL,MOTRIN  Take 1 tablet (600 mg total) by mouth every 6 (six) hours as needed.     oxyCODONE-acetaminophen  mg per tablet  Commonly known as:  PERCOCET  Take 1 tablet by mouth every 6 (six) hours as needed.        CONTINUE taking these medications    docusate sodium 100 MG capsule  Commonly known as:  COLACE  Take 1 capsule (100 mg total) by mouth 2 (two) times daily.        STOP  taking these medications    HYDROcodone-acetaminophen 5-325 mg per tablet  Commonly known as:  NORCO     medroxyPROGESTERone 10 MG tablet  Commonly known as:  PROVERA            Naty Carrero MD  Obstetrics & Gynecology  [facility]

## 2018-10-17 NOTE — PLAN OF CARE
Problem: Patient Care Overview  Goal: Plan of Care Review  Outcome: Ongoing (interventions implemented as appropriate)  Patient on RA with sats as documented. IS done. No distress. Will continue to monitor.

## 2018-10-17 NOTE — PLAN OF CARE
Problem: Patient Care Overview  Goal: Plan of Care Review  Outcome: Ongoing (interventions implemented as appropriate)  Patient on RA, no respiratory distress noted. Will continue to monitor.

## 2018-10-17 NOTE — TELEPHONE ENCOUNTER
----- Message from Naty Carrero MD sent at 10/17/2018  8:24 AM CDT -----  Can we schedule Mrs. Hurst for 2 week postoperative visit?    Naty Carrero MD, FACOG  OB/GYN  Pager: [phone]

## 2018-10-17 NOTE — PROGRESS NOTES
[facility]  Obstetrics & Gynecology  Progress Note    Patient Name: Lani Hurst  MRN: [id number]  Admission Date: 10/16/2018  Primary Care Provider: Primary Doctor No  Principal Problem: Fibroids, intramural    Subjective:     Interval History: POD#1 s/p TLH/RSO secondary to fibroids/ovarian cyst/adhesions. Patient doing. Denies CP/SOB/Nausea/Vomiting. Pain well controlled with PO pain meds. Ambulating, voiding, tolerating regular diet without difficulty.     Scheduled Meds:  Continuous Infusions:    PRN Meds:diphenhydrAMINE, diphenhydrAMINE, HYDROmorphone, ibuprofen, ondansetron, oxyCODONE-acetaminophen, oxyCODONE-acetaminophen, promethazine (PHENERGAN) IVPB    Review of patient's allergies indicates:   Allergen Reactions    Morphine Itching       Objective:     Vital Signs (Most Recent):  Temp: 99 °F (37.2 °C) (10/17/18 0400)  Pulse: 62 (10/17/18 0400)  Resp: 15 (10/17/18 0400)  BP: 114/60 (10/17/18 0400)  SpO2: 100 % (10/17/18 0400) Vital Signs (24h Range):  Temp:  [97.6 °F (36.4 °C)-99.3 °F (37.4 °C)] 99 °F (37.2 °C)  Pulse:  [60-86] 62  Resp:  [13-18] 15  SpO2:  [99 %-100 %] 100 %  BP: (110-134)/(60-72) 114/60     Weight: 75.8 kg (167 lb)  Body mass index is 29.58 kg/m².  Patient's last menstrual period was 08/24/2018.    I&O (Last 24H):    Intake/Output Summary (Last 24 hours) at 10/17/2018 0805  Last data filed at 10/16/2018 2300  Gross per 24 hour   Intake 1200 ml   Output 1975 ml   Net -775 ml       Physical Exam:   Constitutional: She is oriented to person, place, and time. She appears well-developed and well-nourished. No distress.    HENT:   Head: Normocephalic and atraumatic.      Cardiovascular: Normal rate, regular rhythm and normal heart sounds.     Pulmonary/Chest: Effort normal and breath sounds normal. She has no wheezes. She has no rales.        Abdominal: Soft. Bowel sounds are normal. She exhibits no distension.   Incisions- c/d/i             Musculoskeletal: Moves all  extremeties. She exhibits no edema.       Neurological: She is alert and oriented to person, place, and time. She has normal reflexes.    Skin: Skin is warm and dry.    Psychiatric: She has a normal mood and affect.       Laboratory:  CBC:   Recent Labs   Lab  10/17/18   0422   WBC  20.31*   RBC  4.17   HGB  8.5*   HCT  29.0*   PLT  476*   MCV  70*   MCH  20.4*   MCHC  29.3*       Assessment/Plan:     Active Diagnoses:    Diagnosis Date Noted POA    PRINCIPAL PROBLEM:  Fibroids, intramural [D25.1] 10/16/2018 Yes    HSIL (high grade squamous intraepithelial lesion) on Pap smear of cervix [R87.613] 10/12/2018 Yes    Anemia due to blood loss, chronic [D50.0] 11/26/2014 Yes      Problems Resolved During this Admission:       1. Post op- AM labs reviewed. Iron pills/colace on discharge. Given discharge instructions and precautions. RTC in 2 weeks for postoperative visit.     Naty Carrero MD  Obstetrics & Gynecology  [facility]

## 2018-10-23 ENCOUNTER — HOSPITAL ENCOUNTER (EMERGENCY)
Facility: HOSPITAL | Age: 48
Discharge: HOME | End: 2018-10-23
Attending: EMERGENCY MEDICINE
Payer: MEDICAID

## 2018-10-23 VITALS
TEMPERATURE: 99 F | DIASTOLIC BLOOD PRESSURE: 73 MMHG | HEIGHT: 63 IN | HEART RATE: 72 BPM | RESPIRATION RATE: 18 BRPM | WEIGHT: 167 LBS | SYSTOLIC BLOOD PRESSURE: 136 MMHG | BODY MASS INDEX: 29.59 KG/M2 | OXYGEN SATURATION: 100 %

## 2018-10-23 DIAGNOSIS — G89.18 POSTOPERATIVE ABDOMINAL PAIN: ICD-10-CM

## 2018-10-23 DIAGNOSIS — K64.9 HEMORRHOIDS, UNSPECIFIED HEMORRHOID TYPE: Primary | ICD-10-CM

## 2018-10-23 DIAGNOSIS — R10.9 POSTOPERATIVE ABDOMINAL PAIN: ICD-10-CM

## 2018-10-23 LAB
ALBUMIN SERPL BCP-MCNC: 3.8 G/DL
ALP SERPL-CCNC: 68 U/L
ALT SERPL W/O P-5'-P-CCNC: 13 U/L
ANION GAP SERPL CALC-SCNC: 10 MMOL/L
AST SERPL-CCNC: 11 U/L
BASOPHILS # BLD AUTO: 0.03 K/UL
BASOPHILS NFR BLD: 0.2 %
BILIRUB SERPL-MCNC: 0.4 MG/DL
BILIRUB UR QL STRIP: NEGATIVE
BUN SERPL-MCNC: 6 MG/DL
CALCIUM SERPL-MCNC: 9.2 MG/DL
CHLORIDE SERPL-SCNC: 107 MMOL/L
CLARITY UR: CLEAR
CO2 SERPL-SCNC: 21 MMOL/L
COLOR UR: YELLOW
CREAT SERPL-MCNC: 0.8 MG/DL
DIFFERENTIAL METHOD: ABNORMAL
EOSINOPHIL # BLD AUTO: 0.4 K/UL
EOSINOPHIL NFR BLD: 3.3 %
ERYTHROCYTE [DISTWIDTH] IN BLOOD BY AUTOMATED COUNT: 18.7 %
EST. GFR  (AFRICAN AMERICAN): >60 ML/MIN/1.73 M^2
EST. GFR  (NON AFRICAN AMERICAN): >60 ML/MIN/1.73 M^2
GLUCOSE SERPL-MCNC: 113 MG/DL
GLUCOSE UR QL STRIP: NEGATIVE
HCT VFR BLD AUTO: 31.9 %
HGB BLD-MCNC: 9.3 G/DL
HGB UR QL STRIP: ABNORMAL
KETONES UR QL STRIP: NEGATIVE
LEUKOCYTE ESTERASE UR QL STRIP: NEGATIVE
LIPASE SERPL-CCNC: 45 U/L
LYMPHOCYTES # BLD AUTO: 2.9 K/UL
LYMPHOCYTES NFR BLD: 23 %
MCH RBC QN AUTO: 20.1 PG
MCHC RBC AUTO-ENTMCNC: 29.2 G/DL
MCV RBC AUTO: 69 FL
MONOCYTES # BLD AUTO: 0.9 K/UL
MONOCYTES NFR BLD: 7.4 %
NEUTROPHILS # BLD AUTO: 8.2 K/UL
NEUTROPHILS NFR BLD: 65.9 %
NITRITE UR QL STRIP: NEGATIVE
PH UR STRIP: 6 [PH] (ref 5–8)
PLATELET # BLD AUTO: 550 K/UL
PMV BLD AUTO: 8.7 FL
POTASSIUM SERPL-SCNC: 3.7 MMOL/L
PROT SERPL-MCNC: 7.7 G/DL
PROT UR QL STRIP: NEGATIVE
RBC # BLD AUTO: 4.63 M/UL
SODIUM SERPL-SCNC: 138 MMOL/L
SP GR UR STRIP: <=1.005 (ref 1–1.03)
URN SPEC COLLECT METH UR: ABNORMAL
UROBILINOGEN UR STRIP-ACNC: NEGATIVE EU/DL
WBC # BLD AUTO: 12.51 K/UL

## 2018-10-23 PROCEDURE — 83690 ASSAY OF LIPASE: CPT

## 2018-10-23 PROCEDURE — 96360 HYDRATION IV INFUSION INIT: CPT

## 2018-10-23 PROCEDURE — 85025 COMPLETE CBC W/AUTO DIFF WBC: CPT

## 2018-10-23 PROCEDURE — 25500020 PHARM REV CODE 255: Performed by: EMERGENCY MEDICINE

## 2018-10-23 PROCEDURE — 25000003 PHARM REV CODE 250: Performed by: EMERGENCY MEDICINE

## 2018-10-23 PROCEDURE — 81003 URINALYSIS AUTO W/O SCOPE: CPT

## 2018-10-23 PROCEDURE — 99285 EMERGENCY DEPT VISIT HI MDM: CPT | Mod: 25

## 2018-10-23 PROCEDURE — 80053 COMPREHEN METABOLIC PANEL: CPT

## 2018-10-23 RX ORDER — POLYETHYLENE GLYCOL 3350 17 G/17G
17 POWDER, FOR SOLUTION ORAL DAILY
Qty: 7 EACH | Refills: 0 | Status: SHIPPED | OUTPATIENT
Start: 2018-10-23 | End: 2019-06-25 | Stop reason: CLARIF

## 2018-10-23 RX ORDER — OXYCODONE AND ACETAMINOPHEN 5; 325 MG/1; MG/1
1 TABLET ORAL
Status: COMPLETED | OUTPATIENT
Start: 2018-10-23 | End: 2018-10-23

## 2018-10-23 RX ORDER — HYDROCORTISONE 25 MG/G
CREAM TOPICAL 2 TIMES DAILY
Qty: 1 TUBE | Refills: 0 | OUTPATIENT
Start: 2018-10-23 | End: 2019-10-05

## 2018-10-23 RX ORDER — OXYCODONE AND ACETAMINOPHEN 5; 325 MG/1; MG/1
1 TABLET ORAL
Status: DISCONTINUED | OUTPATIENT
Start: 2018-10-23 | End: 2018-10-23

## 2018-10-23 RX ADMIN — OXYCODONE HYDROCHLORIDE AND ACETAMINOPHEN 1 TABLET: 5; 325 TABLET ORAL at 04:10

## 2018-10-23 RX ADMIN — IOHEXOL 75 ML: 350 INJECTION, SOLUTION INTRAVENOUS at 05:10

## 2018-10-23 RX ADMIN — SODIUM CHLORIDE 1000 ML: 0.9 INJECTION, SOLUTION INTRAVENOUS at 04:10

## 2018-10-23 NOTE — ED NOTES
PT REPORTS SHE'S READY TO GO HOME. APPROXIMATELY 500 ML NS FROM BOLUS INFUSED. DR. GRIMALDO AWARE. VERBAL ORDER TO STOP 1 L NS BOLUS AT THIS TIME PER DR. GRIMALDO NOTED.

## 2018-10-23 NOTE — ED NOTES
"PIV ESTABLISHED BUT UNABLE TO COLLECT BLOOD SPECIMEN FROM SITE. PT STATES "SO YOU HAVE TO STICK ME AGAIN?" EXPLAINED TO PT BLOOD SPECIMEN NEEDED TO BE COLLECTED PER MD ORDERS. PT APPEARS AGITATED AFTER 1 UNSUCCESSFUL STRAIGHT STICK PER MYSELF. PT ROLLING EYES AND SIGHING--STATES "I TOLD YOU I'M A HARD STICK AND MY VEINS ROLL." I EXPLAINED TO PT THE VENIPUNCTURE WAS SUCCESSFUL BUT I COULD NOT COLLECT A SUFFICIENT AMOUNT OF BLOOD TO SEND TO LAB. PT EXPRESSED ANNOYANCE AT "I WAS JUST HERE AND NOW I HAVE TO DO THIS ALL OVER AGAIN." EXPLAINED TO PT THAT WE NEEDED BLOOD SPECIMEN FOR LABS. PT V/U BUT REMAINS AGITATED. REQUESTED PHLEBOTOMIST FOR BLOOD DRAW AFTER 1 UNSUCCESSFUL STICK PER MYSELF.  "

## 2018-10-23 NOTE — ED NOTES
"PT REQUESTING TO GO TO RESTROOM TO URINATE. PT CRYING. WHEN ASKED WHAT'S WRONG, PT STATES "I'LL BE ALL RIGHT." PT ESCORTED TO RESTROOM AND OFFERED ASSISTANCE PER MYSELF. PT REFUSED. STATES "I CAN DO IT." STEADY GAIT TO RESTROOM NOTED.  "

## 2018-10-23 NOTE — ED PROVIDER NOTES
Encounter Date: 10/23/2018    SCRIBE #1 NOTE: I, Alissa Burnette, am scribing for, and in the presence of,  Dr. Farley. I have scribed the entire note.       History     Chief Complaint   Patient presents with    Post-op Problem     had a hysterectomy on tuesday performed by Dr Carrero; c/o subjective fever, pain when having bowel movement, abdominal pain and decreased appetite; pt is tearful at triage     Lani Hurst is a 48 y.o. female who  has a past medical history of Anemia, Heart murmur, History of blood transfusion, Mitral valve prolapse, and Ovarian torsion.    This is a 48 y.o. female who presents with complaint of low abdominal pain. She reports onset of symptoms was about 5 days ago. The patient notes prior to onset of symptoms she had a hysterectomy with Dr. Carrero. She notes following the surgery she began to have excruciating pain with having bowel movement. Though she admits to history of hemorrhoids.The patient also reports subjective fever, cold sweats and decreased appetite.       The history is provided by the patient.     Review of patient's allergies indicates:   Allergen Reactions    Morphine Itching     Past Medical History:   Diagnosis Date    Anemia     Heart murmur     History of blood transfusion     Mitral valve prolapse     Ovarian torsion      Past Surgical History:   Procedure Laterality Date    APPENDECTOMY       SECTION      HYSTERECTOMY, TOTAL, LAPAROSCOPIC N/A 10/16/2018    Performed by [facility]y [facility]rero MD at [facility] OR    LAPAROSCOPIC SALPINGO-OOPHORECTOMY Right 10/16/2018    Procedure: SALPINGO-OOPHORECTOMY, LAPAROSCOPIC;  Surgeon: Naty Carrero MD;  Location: [facility] OR;  Service: OB/GYN;  Laterality: Right;    LAPAROSCOPIC TOTAL HYSTERECTOMY N/A 10/16/2018    Procedure: HYSTERECTOMY, TOTAL, LAPAROSCOPIC;  Surgeon: Naty Carrero MD;  Location: [facility] OR;  Service: OB/GYN;  Laterality: N/A;  VIDEO    Left ovary and follopian tube removal       s/p ovarian  torsion    SALPINGO-OOPHORECTOMY, LAPAROSCOPIC Right 10/16/2018    Performed by [facility] at [facility] OR     Family History   Problem Relation Age of Onset    Prostate cancer Father     Breast cancer Neg Hx     Colon cancer Neg Hx     Ovarian cancer Neg Hx      Social History     Tobacco Use    Smoking status: Current Every Day Smoker     Packs/day: 0.50     Types: Cigarettes    Smokeless tobacco: Never Used   Substance Use Topics    Alcohol use: Yes     Comment: occasionally    Drug use: Yes     Types: Marijuana     Review of Systems   Constitutional: Positive for chills and fever (subjective).   HENT: Negative for sore throat.    Respiratory: Negative for cough and shortness of breath.    Cardiovascular: Negative for chest pain.   Gastrointestinal: Positive for abdominal pain and rectal pain (with bowel movement). Negative for nausea and vomiting.   Genitourinary: Negative for dysuria, frequency and urgency.   Musculoskeletal: Negative for back pain, neck pain and neck stiffness.   Skin: Negative for rash and wound.   Neurological: Negative for syncope and weakness.   Hematological: Does not bruise/bleed easily.   Psychiatric/Behavioral: Negative for agitation, behavioral problems and confusion.       Physical Exam     Initial Vitals [10/23/18 0351]   BP Pulse Resp Temp SpO2   (!) 156/77 96 18 98.9 °F (37.2 °C) 100 %      MAP       --         Physical Exam    Nursing note and vitals reviewed.  Constitutional: She appears well-developed and well-nourished. She is not diaphoretic. No distress.   HENT:   Head: Normocephalic and atraumatic.   Mouth/Throat: Oropharynx is clear and moist.   Eyes: Conjunctivae and EOM are normal. Pupils are equal, round, and reactive to light.   Neck: Normal range of motion. Neck supple.   Cardiovascular: Normal rate, regular rhythm and normal heart sounds. Exam reveals no gallop and no friction rub.    No murmur heard.  Pulmonary/Chest: Breath sounds normal. She has no  wheezes. She has no rhonchi. She has no rales.   Abdominal: Soft. Bowel sounds are normal. There is tenderness. There is no rebound and no guarding.   Umbilical incision that appears C/D/I.  Moderate generalized periumbilical  tenderness   Genitourinary:   Genitourinary Comments: External hemorrhoids visible  No internal hemorrhoids or fluctuance  No apparent anal fissure    Musculoskeletal: Normal range of motion. She exhibits no edema or tenderness.   Lymphadenopathy:     She has no cervical adenopathy.   Neurological: She is alert and oriented to person, place, and time. She has normal strength.   Skin: Skin is warm and dry. Capillary refill takes less than 2 seconds. No rash noted.         ED Course   Procedures  Labs Reviewed   CBC W/ AUTO DIFFERENTIAL - Abnormal; Notable for the following components:       Result Value    Hemoglobin 9.3 (*)     Hematocrit 31.9 (*)     MCV 69 (*)     MCH 20.1 (*)     MCHC 29.2 (*)     RDW 18.7 (*)     Platelets 550 (*)     MPV 8.7 (*)     Gran # (ANC) 8.2 (*)     All other components within normal limits   COMPREHENSIVE METABOLIC PANEL - Abnormal; Notable for the following components:    CO2 21 (*)     Glucose 113 (*)     All other components within normal limits   URINALYSIS - Abnormal; Notable for the following components:    Specific Gravity, UA <=1.005 (*)     Occult Blood UA Trace (*)     All other components within normal limits   LIPASE          Imaging Results          CT Abdomen Pelvis With Contrast (Final result)  Result time 10/23/18 06:04:57    Final result by Juan Sheppard MD (10/23/18 06:04:57)                 Impression:      1.  Postoperative changes relating to to recent hysterectomy with small volume of relatively simple appearing ill-defined fluid and stranding about the vaginal cuff.  No discrete well-defined peripherally enhancing fluid collection identified at this time.    2.  Indeterminate 3.4 cm left adrenal mass.  Follow-up is recommended with  dedicated adrenal protocol MRI or CT.    3.  Mild wall thickening of the sigmoid colon favored to represent reactive change secondary to adjacent postoperative inflammatory change.    4.  Multiple subcentimeter hepatic hypodensities which are too small to accurately characterize.    5.  Additional incidental findings as above.      Electronically signed by: Juan Sheppard MD  Date:    10/23/2018  Time:    06:04             Narrative:    EXAMINATION:  CT ABDOMEN PELVIS WITH CONTRAST    CLINICAL HISTORY:  Infection, abdomen-pelvis;s/p abdominal hysterectomy with worsening abdominal pain and vomiting fever;    TECHNIQUE:  Low dose axial images, sagittal and coronal reformations were obtained from the lung bases to the pubic symphysis following the IV administration of 75 mL of Omnipaque 350 .  Oral contrast was not given.    COMPARISON:  None.    FINDINGS:  There are linear bandlike opacities at the left lung base suggestive of platelike atelectasis or scarring.  No significant pleural effusion.  The visualized portions of the heart appear normal.    The liver demonstrates several subcentimeter right hepatic lobe hypodensities which are too small to accurately characterize.  The portal vein is patent.  The gallbladder shows no evidence of stones or pericholecystic fluid.  There is no intra-or extrahepatic biliary ductal dilatation.    The stomach, spleen, and pancreas are unremarkable.  There is an indeterminate 3.4 cm left adrenal lesion.  The right adrenal gland appears within normal limits.    The kidneys are normal in size and location and concentrate and excrete contrast properly on delayed imaging.  There is no evidence of hydronephrosis. The urinary bladder is unremarkable.    The patient is status post hysterectomy.  There is small volume of relatively simple appearing ill-defined fluid within the pelvis about the vaginal cuff.  Mild fat stranding is also present within the pelvis.  No discrete well-defined  peripherally enhancing collection is identified at this time.  The adnexa appear within normal limits.    The abdominal aorta is normal in course and caliber without significant atherosclerotic calcifications.    There is no evidence of small-bowel obstruction.  There is mild wall thickening of the sigmoid colon favored to relate to reactive change.  There is scattered colonic diverticulosis.  The appendix is not definitely visualized, however no focal inflammatory change is seen at its expected location.  There is no free intraperitoneal air or portal venous gas.  There are bilateral nonenlarged angle lymph nodes.    When viewed with bone windows the osseous structures are unremarkable.  There are linear regions of increased soft tissue induration within the anterior abdominal wall presumably relating to prior trocar sites.  No focal subcutaneous fluid collection identified.                                 Medical Decision Making:   Differential Diagnosis:   Post operative seroma, abscess, bowel obstruction, ileus internal hemorrhoid/ extrenal hemorrhoiod   ED Management:  Labs notable for dehydration, anemia at baseline     Patient in no apparent distress on re-evaluation. I informed her of CT results showing incidental finding adrenal mass and post operative changes. Patient advised to follow up with PCP for further surveillance of the adrenal mass and she was given a copy of her CT report patient. Rectal exam notable for hemorrhoids will DC with MiraLax and supportive care for hemorrhoids (witch hazel, anusol, stool softener).I recommended follow-up with Dr. Carrero to inform that patient was seen in the ED tonight and I  have placed an ambulatory referral as well.  Return precautions for worsening pain, fever, vomiting, or other concern.     After taking into careful account the historical factors and physical exam findings of the patient's presentation today, in conjunction with the empirical and objective  data obtained on ED workup, no acute emergent medical condition has been identified. The patient appears to be low risk for an emergent medical condition and I feel it is safe and appropriate at this time for the patient to be discharged to follow-up as detailed in their discharge instructions for reevaluation and possible continued outpatient workup and management. I have discussed the specifics of the workup with the patient and the patient has verbalized understanding of the details of the workup, the diagnosis, the treatment plan, and the need for outpatient follow-up.  Although the patient has no emergent etiology today this does not preclude the development of an emergent condition so in addition, I have advised the patient that they can return to the ED and/or activate EMS at any time with worsening of their symptoms, change of their symptoms, or with any other medical complaint.  The patient remained comfortable and stable during their visit in the ED.  Discharge and follow-up instructions discussed with the patient who expressed understanding and willingness to comply with my recommendations.                     ED Course as of Oct 23 0619   Tue Oct 23, 2018   0538 Cbc appears near baseline with anemia and thrombocytosis , UA/ cmp consistent with dehydration   [RN]      ED Course User Index  [RN] Stas Farley Jr., MD     Clinical Impression:     1. Hemorrhoids, unspecified hemorrhoid type    2. Postoperative abdominal pain        Disposition:   Disposition: Discharged  Condition: Stable    Scribe attestation I, Stas Farley,  personally performed the services described in this documentation. All medical record entries made by the scribe were at my direction and in my presence.  I have reviewed the chart and agree that the record reflects my personal performance and is accurate and complete. Stas Farley M.D. 6:26 AM10/23/2018                    Stas Farley Jr., MD  10/23/18 2199

## 2018-10-23 NOTE — ED NOTES
"PT REPORTS HYSTERECTOMY ON Tuesday. C/O CHILLS ONSET TODAY. REPORTS SHE DID NOT TAKE HER TEMPERATURE AT HOME BECAUSE SHE DOES NOT HAVE A THERMOMETER. STATES "MY STOMACH IS SORE BUT THAT'S NOTHING KNEW. IT HURTS SO BAD WHEN I TRY TO HAVE A BOWEL MOVEMENT." PT REPORTS CURRENTLY TAKING PERCOCET FOR PAIN AND COLACE.  "

## 2018-10-24 ENCOUNTER — TELEPHONE (OUTPATIENT)
Dept: OBSTETRICS AND GYNECOLOGY | Facility: CLINIC | Age: 48
End: 2018-10-24

## 2018-10-24 NOTE — TELEPHONE ENCOUNTER
I'm sorry felt that way. We can discuss at her next visit. pSee if she would like to come in tomorrow or Friday to be seen     Naty Carrero MD, FACOG  OB/GYN  Pager: [phone]

## 2018-10-24 NOTE — TELEPHONE ENCOUNTER
----- Message from Kortney Obregon sent at 10/24/2018 11:01 AM CDT -----  Contact: [phone]/Self  Patient called stating she is having symptoms since procedure. States she gone to the ER. Please call [phone].

## 2018-10-24 NOTE — TELEPHONE ENCOUNTER
Returned pt's call, pt states she went to the ED yesterday for complications following her procedure. Pt states she is nauseous due to the contrast in CT Scan and have been having abdominal pain. Pt states she was told she has Hemorrhoids but has been dealing with hemorrhoids since she was 14 and knows that's not what it is, states she is not constipated and has been taking Colace. Pt states her experience in  ED was horrible and she reported it to Patient relations. Pt states she is not in any pain right now just normal healing discomfort and will come in for post op 10/31.

## 2018-10-25 NOTE — TELEPHONE ENCOUNTER
Contacted pt to see if she would like to be seen sooner than 10/31, offered pt appt for tomorrow in [place], 10/26 at 10:30am Patient verbalized understanding.

## 2018-10-26 ENCOUNTER — OFFICE VISIT (OUTPATIENT)
Dept: OBSTETRICS AND GYNECOLOGY | Facility: CLINIC | Age: 48
End: 2018-10-26
Payer: MEDICAID

## 2018-10-26 VITALS
WEIGHT: 171.94 LBS | SYSTOLIC BLOOD PRESSURE: 124 MMHG | TEMPERATURE: 99 F | DIASTOLIC BLOOD PRESSURE: 68 MMHG | BODY MASS INDEX: 30.46 KG/M2

## 2018-10-26 DIAGNOSIS — Z98.890 POST-OPERATIVE STATE: Primary | ICD-10-CM

## 2018-10-26 DIAGNOSIS — D06.9 CIN III WITH SEVERE DYSPLASIA: ICD-10-CM

## 2018-10-26 PROBLEM — R87.613 HSIL (HIGH GRADE SQUAMOUS INTRAEPITHELIAL LESION) ON PAP SMEAR OF CERVIX: Status: RESOLVED | Noted: 2018-10-12 | Resolved: 2018-10-26

## 2018-10-26 PROBLEM — D25.1 INTRAMURAL LEIOMYOMA OF UTERUS: Status: RESOLVED | Noted: 2017-07-12 | Resolved: 2018-10-26

## 2018-10-26 PROBLEM — D25.1 FIBROIDS, INTRAMURAL: Status: RESOLVED | Noted: 2018-10-16 | Resolved: 2018-10-26

## 2018-10-26 PROCEDURE — 99024 POSTOP FOLLOW-UP VISIT: CPT | Mod: S$PBB,,, | Performed by: OBSTETRICS & GYNECOLOGY

## 2018-10-26 PROCEDURE — 99213 OFFICE O/P EST LOW 20 MIN: CPT | Mod: PBBFAC,PO | Performed by: OBSTETRICS & GYNECOLOGY

## 2018-10-26 PROCEDURE — 99999 PR PBB SHADOW E&M-EST. PATIENT-LVL III: CPT | Mod: PBBFAC,,, | Performed by: OBSTETRICS & GYNECOLOGY

## 2018-10-26 NOTE — LETTER
October 26, 2018      Stas Farley Jr., MD  [address]  [facility] [address]           [facility]  [address], [address]  [address]th [address] [facility]  [place] [address]  Phone: [phone]          Patient: Lani Hurst   MR Number: [id number]   YOB: 1970   Date of Visit: 10/26/2018       Dear Dr. Stas Farley Jr.:    Thank you for referring Lani Hurst to me for evaluation. Attached you will find relevant portions of my assessment and plan of care.    If you have questions, please do not hesitate to call me. I look forward to following Lani Hurst along with you.    Sincerely,    Naty Carrero MD    Enclosure  CC:  No Recipients    If you would like to receive this communication electronically, please contact [email or web address] or [phone] to request more information on [employer] Link access.    For providers and/or their staff who would like to refer a patient to [facility], please contact us through our one-stop-shop provider referral line, [facility], at [phone].    If you feel you have received this communication in error or would no longer like to receive these types of communications, please e-mail [email or web address]

## 2018-10-26 NOTE — PROGRESS NOTES
CC: Postoperative visit    Lani Hurst is a 48 y.o. female  presents for a postoperative visit s/p TLH/RSO on 10/16/18.  Her postoperative course was uncomplicated.  She is doing well postoperatively. But went to ED earlier this week for pain with having bowel movements. Noted to have hemorrhoids. CT scan unremarkable for signs of infection but did show an adrenal mass. Discussed pathology and need for continued pap smears of vaginal cuff. Pt without complaint currently.    Pathology showed:  Uterus, cervix, fallopian tubes, ovaries:  -Endometrium: Inactive/basalis mucosa; negative for atypia or malignancy  -Myometrium: Serosal adhesions. Benign leiomyoma  -Ovaries: Hemorrhagic corpus luteum; no significant histopathologic change  -Fallopian tubes: Delicate fibrous adhesions; no atypia or malignancy    Cervix:  -High-grade squamous intraepithelial lesion (CRICKET 2 and CRICKET 3/CIS)  -High-grade dysplasia extends to involve endocervical glands  -Dysplasia present in all 4 quadrants  -No evidence of invasion  -Surgical margins uninvolved    /68   Temp 98.9 °F (37.2 °C) (Oral)   Wt 78 kg (171 lb 15.3 oz)   LMP  (LMP Unknown)   BMI 30.46 kg/m²     ROS:  GENERAL: No fever, chills, fatigability or weight loss.  VULVAR: No pain, no lesions and no itching.  VAGINAL: No relaxation, no itching, no discharge, no abnormal bleeding and no lesions.  ABDOMEN: No abdominal pain. Denies nausea. Denies vomiting. No diarrhea. No constipation  BREAST: Denies pain. No lumps. No discharge.  URINARY: No incontinence, no nocturia, no frequency and no dysuria.  CARDIOVASCULAR: No chest pain. No shortness of breath. No leg cramps.  NEUROLOGICAL: No headaches. No vision changes.    Physical Exam:  GENERAL: alert, appears stated age and cooperative  NEUROLOGIC: orientated to person, place and time, normal mood and affect   CHEST: Normal respiratory effort  NECK: normal appearance, no thyromegaly masses or tenderness  SKIN: no  acne, striae, hirsutism  BREAST EXAM: not examined  ABDOMEN: abdomen is soft without significant tenderness, masses, organomegaly or guarding, no hernias noted  INCISIONS: healing well      1. Post-operative state     2. CRICKET III with severe dysplasia s/p [facility] 10/26/18- needs paps until 2038         RTC in 5 weeks for post op vaginal exam. Schedule appointment with internal medicine to complete work-up for adrenal mass    Naty Carrero MD, FACOG  OB/GYN  Pager: [phone]

## 2018-11-05 ENCOUNTER — TELEPHONE (OUTPATIENT)
Dept: OBSTETRICS AND GYNECOLOGY | Facility: CLINIC | Age: 48
End: 2018-11-05

## 2018-11-05 NOTE — TELEPHONE ENCOUNTER
Returned pt's call, pt is 3wks post op Hyst, experiencing light pink vaginal discharge, pt states the spotting started a couple days ago and has been very minimal, only when she wipes. Pt denies pain except a little pain when having a bowel movement.

## 2018-11-05 NOTE — TELEPHONE ENCOUNTER
Contacted pt and informed of Dr. Carrero's advice, pt states she doesn't feel like she need to be seen today just wanted to make sure the light spotting is normal. Informed pt her Cuff is still healing however she shouldn't experience bright red blood or heavy bleeding. Pt states she will monitor the spotting and follow up with us by her next post op appt. Patient verbalized understanding.

## 2018-11-05 NOTE — TELEPHONE ENCOUNTER
Can offer patient appointment to be seen for evaluation. If unable to come in give precautions for any heavy bleeding- changing pads or abdominal pain. Pt should also avoid straining- colace/stool softener as needed.     Naty Carrero MD, FACOG  OB/GYN  Pager: [phone]

## 2018-11-05 NOTE — TELEPHONE ENCOUNTER
----- Message from Gretta Soria sent at 11/5/2018  8:00 AM CST -----  Contact: Self/ [phone]  Patient is having vaginal discharge with a little bit of blood. She is 3 weeks post hysterectomy.    Please call.

## 2018-11-20 ENCOUNTER — TELEPHONE (OUTPATIENT)
Dept: OBSTETRICS AND GYNECOLOGY | Facility: CLINIC | Age: 48
End: 2018-11-20

## 2018-11-20 NOTE — TELEPHONE ENCOUNTER
Called pt in regards to getting her hysterectomy consent form signed. Pt states that she is out of town and wont be back until Friday. Pt states that she will stop by on Friday to sign the consent form.

## 2018-11-20 NOTE — TELEPHONE ENCOUNTER
----- Message from Rick Del Castillo sent at 11/16/2018  9:56 AM CST -----  Contact: Tianna from [facility]/ext. [phone]  She needs to get a Hysterectomy consent form.

## 2018-11-23 ENCOUNTER — TELEPHONE (OUTPATIENT)
Dept: OBSTETRICS AND GYNECOLOGY | Facility: CLINIC | Age: 48
End: 2018-11-23

## 2018-11-23 RX ORDER — FLUCONAZOLE 150 MG/1
TABLET ORAL
Qty: 2 TABLET | Refills: 0 | Status: SHIPPED | OUTPATIENT
Start: 2018-11-23 | End: 2019-05-15

## 2018-11-23 NOTE — TELEPHONE ENCOUNTER
Can try diflucan and if symptoms persist will address at clinic visit. Rx sent    Naty Carrero MD, FACOG  OB/GYN  Pager: [phone]

## 2018-11-26 ENCOUNTER — OFFICE VISIT (OUTPATIENT)
Dept: OBSTETRICS AND GYNECOLOGY | Facility: CLINIC | Age: 48
End: 2018-11-26
Payer: MEDICAID

## 2018-11-26 ENCOUNTER — TELEPHONE (OUTPATIENT)
Dept: OBSTETRICS AND GYNECOLOGY | Facility: CLINIC | Age: 48
End: 2018-11-26

## 2018-11-26 VITALS
WEIGHT: 175.94 LBS | SYSTOLIC BLOOD PRESSURE: 116 MMHG | DIASTOLIC BLOOD PRESSURE: 72 MMHG | BODY MASS INDEX: 31.16 KG/M2

## 2018-11-26 DIAGNOSIS — E27.8 ADRENAL MASS: ICD-10-CM

## 2018-11-26 DIAGNOSIS — N89.8 VAGINAL GRANULATION TISSUE: ICD-10-CM

## 2018-11-26 DIAGNOSIS — N94.89 ADNEXAL MASS: ICD-10-CM

## 2018-11-26 DIAGNOSIS — Z98.890 POST-OPERATIVE STATE: Primary | ICD-10-CM

## 2018-11-26 PROCEDURE — 99024 POSTOP FOLLOW-UP VISIT: CPT | Mod: S$PBB,,, | Performed by: OBSTETRICS & GYNECOLOGY

## 2018-11-26 PROCEDURE — 99999 PR PBB SHADOW E&M-EST. PATIENT-LVL III: CPT | Mod: PBBFAC,,, | Performed by: OBSTETRICS & GYNECOLOGY

## 2018-11-26 PROCEDURE — 99213 OFFICE O/P EST LOW 20 MIN: CPT | Mod: PBBFAC,PO | Performed by: OBSTETRICS & GYNECOLOGY

## 2018-11-26 NOTE — PROGRESS NOTES
CC: Postoperative visit    Lani Hurst is a 48 y.o. female  presents for a postoperative visit s/p TLH/RSO on 10/16/18.  Her postoperative course was uncomplicated.  She is doing well postoperatively. Previously Discussed pathology and need for continued pap smears of vaginal cuff. Pt without complaint today.    Pathology showed:  Uterus, cervix, fallopian tubes, ovaries:  -Endometrium: Inactive/basalis mucosa; negative for atypia or malignancy  -Myometrium: Serosal adhesions. Benign leiomyoma  -Ovaries: Hemorrhagic corpus luteum; no significant histopathologic change  -Fallopian tubes: Delicate fibrous adhesions; no atypia or malignancy    Cervix:  -High-grade squamous intraepithelial lesion (CRICKET 2 and CRICKET 3/CIS)  -High-grade dysplasia extends to involve endocervical glands  -Dysplasia present in all 4 quadrants  -No evidence of invasion  -Surgical margins uninvolved    /72   Wt 79.8 kg (175 lb 14.8 oz)   LMP  (LMP Unknown)   BMI 31.16 kg/m²     ROS:  GENERAL: No fever, chills, fatigability or weight loss.  VULVAR: No pain, no lesions and no itching.  VAGINAL: No relaxation, no itching, no discharge, no abnormal bleeding and no lesions.  ABDOMEN: No abdominal pain. Denies nausea. Denies vomiting. No diarrhea. No constipation  BREAST: Denies pain. No lumps. No discharge.  URINARY: No incontinence, no nocturia, no frequency and no dysuria.  CARDIOVASCULAR: No chest pain. No shortness of breath. No leg cramps.  NEUROLOGICAL: No headaches. No vision changes.    Physical Exam:  GENERAL: alert, appears stated age and cooperative  NEUROLOGIC: orientated to person, place and time, normal mood and affect   CHEST: Normal respiratory effort  NECK: normal appearance, no thyromegaly masses or tenderness  SKIN: no acne, striae, hirsutism  BREAST EXAM: not examined  ABDOMEN: abdomen is soft without significant tenderness, masses, organomegaly or guarding, no hernias noted  INCISIONS: healing well    Physical  Exam:  GENERAL: alert, appears stated age and cooperative  NEUROLOGIC: orientated to person, place and time, normal mood and affect   CHEST: Normal respiratory effort  NECK: normal appearance  SKIN: no acne, striae, hirsutism  ABDOMEN: abdomen is soft without significant tenderness, masses, organomegaly or guarding, no hernias noted  INCISION: clean/dry/intact. No signs of infection  EXTERNAL GENITALIA:  normal general appearance  URETHRA: normal urethra, normal urethral meatus  VAGINA:  cuff well healed and intact. 0.2mm area of granulation tissuue (silver nitrite applied)  CERVIX:  absent cervix  UTERUS:  surgically absent  ADNEXA: nontender      1. Postpartum state     2. Vaginal granulation tissue     3. Adrenal mass  MRI Abdomen W WO Contrast       Return to regular activity with lifting of restriction of pelvic rest in 2 weeks. Order placed for f/u adrenal mass. Pt to schedule with PCP     Naty Carrero MD, FACOG  OB/GYN  Pager: [phone]

## 2018-11-26 NOTE — TELEPHONE ENCOUNTER
Call was returned. Pt states she will be 10 minutes to her appt. Pt was advised that's fine. Pt verbalized understanding.

## 2018-11-26 NOTE — TELEPHONE ENCOUNTER
----- Message from Gina Quispe sent at 11/26/2018 10:06 AM CST -----  Contact: Self [phone]  Patient is running late. Please advise.

## 2019-02-08 ENCOUNTER — PATIENT MESSAGE (OUTPATIENT)
Dept: OBSTETRICS AND GYNECOLOGY | Facility: CLINIC | Age: 49
End: 2019-02-08

## 2019-02-08 NOTE — TELEPHONE ENCOUNTER
Per chart review- talked and discussed with patient Aug 30, 2018 at 0821am. I explained her pap results and need for colposcopy. Pt desired to skip the colposcopy and proceed with hysterectomy. I also informed her that she would need pap smears of her vaginal cuff for 20 yrs after her hysterectomy as well.     I also explained final pathology from her hysterectomy at her post operative appointments on 10/26/2018 and on 11/26/18.  She will need pap's until the year of 2038. A hysterectomy is the treatment for CIN3.     Called to explained again the need for continued paps. Can request records through previous gyn or go through medical records.  Pt voiced understanding.    Naty Carrero MD, FACOG  OB/GYN  Pager: [phone]

## 2019-05-15 ENCOUNTER — TELEPHONE (OUTPATIENT)
Dept: OBSTETRICS AND GYNECOLOGY | Facility: CLINIC | Age: 49
End: 2019-05-15

## 2019-05-15 RX ORDER — FLUCONAZOLE 150 MG/1
TABLET ORAL
Qty: 2 TABLET | Refills: 0 | Status: SHIPPED | OUTPATIENT
Start: 2019-05-15 | End: 2019-06-25 | Stop reason: CLARIF

## 2019-05-15 NOTE — TELEPHONE ENCOUNTER
Pt recently took antibiotics and is requesting a prescription for a yeast infection sent to pharmacy on file.

## 2019-05-15 NOTE — TELEPHONE ENCOUNTER
----- Message from Gina Quispe sent at 5/15/2019  3:48 PM CDT -----  Contact: Self [phone]  Patient would like to speak with you about getting a yeast infection after antibiotics and needs medication. Please advise

## 2019-06-25 ENCOUNTER — PATIENT MESSAGE (OUTPATIENT)
Dept: OBSTETRICS AND GYNECOLOGY | Facility: CLINIC | Age: 49
End: 2019-06-25

## 2019-06-25 ENCOUNTER — HOSPITAL ENCOUNTER (EMERGENCY)
Facility: HOSPITAL | Age: 49
Discharge: HOME | End: 2019-06-25
Attending: EMERGENCY MEDICINE
Payer: MEDICAID

## 2019-06-25 ENCOUNTER — TELEPHONE (OUTPATIENT)
Dept: OBSTETRICS AND GYNECOLOGY | Facility: CLINIC | Age: 49
End: 2019-06-25

## 2019-06-25 VITALS
BODY MASS INDEX: 32.68 KG/M2 | RESPIRATION RATE: 20 BRPM | HEART RATE: 100 BPM | DIASTOLIC BLOOD PRESSURE: 84 MMHG | SYSTOLIC BLOOD PRESSURE: 152 MMHG | HEIGHT: 63 IN | OXYGEN SATURATION: 97 % | WEIGHT: 184.44 LBS | TEMPERATURE: 99 F

## 2019-06-25 DIAGNOSIS — R03.0 ELEVATED BLOOD PRESSURE READING: ICD-10-CM

## 2019-06-25 DIAGNOSIS — K59.00 CONSTIPATION: Primary | ICD-10-CM

## 2019-06-25 DIAGNOSIS — F17.200 CURRENT SMOKER: ICD-10-CM

## 2019-06-25 DIAGNOSIS — K64.4 HEMORRHOIDS, EXTERNAL WITHOUT COMPLICATIONS: ICD-10-CM

## 2019-06-25 PROCEDURE — 99284 EMERGENCY DEPT VISIT MOD MDM: CPT

## 2019-06-25 RX ORDER — POLYETHYLENE GLYCOL 3350 17 G/17G
17 POWDER, FOR SOLUTION ORAL DAILY
Qty: 116 G | Refills: 0 | Status: SHIPPED | OUTPATIENT
Start: 2019-06-25

## 2019-06-25 RX ORDER — DOCUSATE SODIUM 100 MG/1
100 CAPSULE, LIQUID FILLED ORAL DAILY
Qty: 30 CAPSULE | Refills: 0 | Status: SHIPPED | OUTPATIENT
Start: 2019-06-25

## 2019-06-25 NOTE — ED PROVIDER NOTES
"SCRIBE #1 NOTE: I, , am scribing for, and in the presence of, Nayla Mcknight PA-C. I have scribed the entire note.       History     Chief Complaint   Patient presents with    Constipation     Pt states, "I have been constipated for a week, and it's making it hard to eat something."     Review of patient's allergies indicates:   Allergen Reactions    Morphine Itching         History of Present Illness     HPI    2019, 3:41PM  History obtained from the patient      History of Present Illness: Lani Hurst is a 49 y.o. female patient with a PMHx of anemia who presents to the Emergency Department for evaluation of constipation which onset gradually 1 week ago. Symptoms are constant and moderate in severity. No mitigating or exacerbating factors reported. Pt also reports history of hemorrhoids and that she has had mild rectal bleeding. Patient denies any abdominal pain, fever, chills, dysuria, hematuria, urinary frequency, N/V/D, and all other sxs at this time. Pt states she took a laxative this past weekend with minor relief at the time. No further complaints or concerns at this time.          Arrival mode: Personal vehicle     PCP: Primary Doctor No        Past Medical History:  Past Medical History:   Diagnosis Date    Anemia     Heart murmur     History of blood transfusion     Mitral valve prolapse     Ovarian torsion        Past Surgical History:  Past Surgical History:   Procedure Laterality Date    APPENDECTOMY       SECTION      HYSTERECTOMY      HYSTERECTOMY, TOTAL, LAPAROSCOPIC N/A 10/16/2018    Performed by [facility] at [facility]    Left ovary and follopian tube removal       s/p ovarian torsion    SALPINGO-OOPHORECTOMY, LAPAROSCOPIC Right 10/16/2018    Performed by [facility] at [facility] OR         Family History:  Family History   Problem Relation Age of Onset    Prostate cancer Father     Breast cancer Neg Hx     Colon cancer Neg Hx     Ovarian " cancer Neg Hx        Social History:  Social History     Tobacco Use    Smoking status: Current Every Day Smoker     Packs/day: 0.50     Types: Cigarettes    Smokeless tobacco: Never Used   Substance and Sexual Activity    Alcohol use: Yes     Comment: occasionally    Drug use: Yes     Types: Marijuana    Sexual activity: Yes     Partners: Male     Birth control/protection: None        Review of Systems     Review of Systems   Constitutional: Negative for chills and fever.   HENT: Negative for sore throat.    Respiratory: Negative for shortness of breath.    Cardiovascular: Negative for chest pain.   Gastrointestinal: Positive for blood in stool and constipation. Negative for abdominal pain, diarrhea, nausea and vomiting.   Genitourinary: Negative for dysuria, frequency and hematuria.   Musculoskeletal: Negative for back pain.   Skin: Negative for rash.   Neurological: Negative for weakness.   Hematological: Does not bruise/bleed easily.   All other systems reviewed and are negative.       Physical Exam     Initial Vitals [06/25/19 1528]   BP Pulse Resp Temp SpO2   (!) 152/84 100 20 98.6 °F (37 °C) 97 %      MAP       --          Physical Exam  Nursing Notes and Vital Signs Reviewed.  Constitutional: Patient is in no apparent distress. Well-developed and well-nourished.  Head: Atraumatic. Normocephalic.  Eyes: PERRL. EOM intact. Conjunctivae are not pale. No scleral icterus.  ENT: Mucous membranes are moist. Oropharynx is clear and symmetric.    Neck: Supple. Full ROM. No lymphadenopathy.  Cardiovascular: Regular rate. Regular rhythm. No murmurs, rubs, or gallops. Distal pulses are 2+ and symmetric.  Pulmonary/Chest: No respiratory distress. Clear to auscultation bilaterally. No wheezing or rales.  Abdominal: Soft and non-distended.  There is no tenderness.  No rebound, guarding, or rigidity. Good bowel sounds.  Rectal: There is no tenderness. No masses. 2 external hemorrhoids with no thrombosis or bleeding  "present. Normal sphincter tone. No bowel impaction.   Genitourinary: No CVA tenderness  Musculoskeletal: Moves all extremities. No obvious deformities. No edema.  Skin: Warm and dry.  Neurological:  Alert, awake, and appropriate.  Normal speech.  No acute focal neurological deficits are appreciated.  Psychiatric: Normal affect. Good eye contact. Appropriate in content.     ED Course   Procedures  ED Vital Signs:  Vitals:    06/25/19 1528   BP: (!) 152/84   Pulse: 100   Resp: 20   Temp: 98.6 °F (37 °C)   TempSrc: Oral   SpO2: 97%   Weight: 83.7 kg (184 lb 6.6 oz)   Height: 5' 3" (1.6 m)         Imaging Results:  Imaging Results          X-Ray Abdomen Flat And Erect (Final result)  Result time 06/25/19 16:05:04    Final result by Parveen Bernal MD (06/25/19 16:05:04)                 Impression:      No acute abnormality identified.      Electronically signed by: Parveen Bernal  Date:    06/25/2019  Time:    16:05             Narrative:    EXAMINATION:  XR ABDOMEN FLAT AND ERECT    CLINICAL HISTORY:  Constipation, unspecified    TECHNIQUE:  Flat and erect AP views of the abdomen were performed.    COMPARISON:  None    FINDINGS:  No free air.  No evidence of obstruction or ileus.  No radiopaque foreign body, or abnormal calcification.  Osseous structures unremarkable.                                        The Emergency Provider reviewed the vital signs and test results, which are outlined above.     ED Discussion     4:21 PM: Reassessed pt at this time.  Pt states her condition has improved at this time. Discussed with pt all pertinent ED information and results. Discussed pt dx and plan of tx. Gave pt all f/u and return to the ED instructions. All questions and concerns were addressed at this time. Pt expresses understanding of information and instructions, and is comfortable with plan to discharge. Pt is stable for discharge.    I discussed with patient and/or family/caretaker that evaluation in the ED does not suggest " any emergent or life threatening medical conditions requiring immediate intervention beyond what was provided in the ED, and I believe patient is safe for discharge.  Regardless, an unremarkable evaluation in the ED does not preclude the development or presence of a serious of life threatening condition. As such, patient was instructed to return immediately for any worsening or change in current symptoms.    Pre-hypertension/Hypertension: The pt has been informed that they may have pre-hypertension or hypertension based on a blood pressure reading in the ED. I recommend that the pt call the PCP listed on their discharge instructions or a physician of their choice this week to arrange f/u for further evaluation of possible pre-hypertension or hypertension.       ED Medication(s):  Medications - No data to display    Discharge Medication List as of 6/25/2019  4:25 PM      START taking these medications    Details   docusate sodium (COLACE) 100 MG capsule Take 1 capsule (100 mg total) by mouth once daily., Starting Tue 6/25/2019, Print      polyethylene glycol (GLYCOLAX) 17 gram/dose powder Take 17 g by mouth once daily., Starting Tue 6/25/2019, Print             Follow-up Information     [facility]. Schedule an appointment as soon as possible for a visit in 3 days.    Contact information:  [address]  [place] [address]    Phone:  [phone]                           Additional MDM:   Hypertension: The patient has hypertension (no treatment required at this time). The patient's condition was felt to be stable.   Smoking Cessation: The patient is a smoker. The patient was counseled on smoking cessation for: 3 minutes. The patient was counseled on tobacco related  health complications.          Scribe Attestation:   Scribe #1: I performed the above scribed service and the documentation accurately describes the services I performed. I attest to the accuracy of the note.     Attending:   Physician  Attestation Statement for Scribe #1: I, Nayla Mcknight PA-C, personally performed the services described in this documentation, as scribed by Rakan Dick, in my presence, and it is both accurate and complete.           Clinical Impression       ICD-10-CM ICD-9-CM   1. Constipation K59.00 564.00   2. Hemorrhoids, external without complications K64.4 455.3   3. Elevated blood pressure reading R03.0 796.2   4. Current smoker F17.200 305.1       Disposition:   Disposition: Discharged  Condition: Stable         Nayla Mcknight PA-C  06/25/19 [id number]

## 2019-06-25 NOTE — TELEPHONE ENCOUNTER
----- Message from Shyla Obregon, RT sent at 6/25/2019  7:22 AM CDT -----  Contact: RADIOLOGY  Patient is calling to get her MRI re-scheduled, but test was DENIED by insurance an in basket message was sent to the provider to explain why, please reach out to pre-service and also patient is requesting a call to determine what she needs to do.

## 2019-07-15 ENCOUNTER — HOSPITAL ENCOUNTER (EMERGENCY)
Facility: HOSPITAL | Age: 49
Discharge: HOME | End: 2019-07-15
Attending: EMERGENCY MEDICINE
Payer: MEDICAID

## 2019-07-15 VITALS
DIASTOLIC BLOOD PRESSURE: 57 MMHG | SYSTOLIC BLOOD PRESSURE: 122 MMHG | OXYGEN SATURATION: 100 % | TEMPERATURE: 97 F | RESPIRATION RATE: 20 BRPM | HEIGHT: 63 IN | HEART RATE: 63 BPM | WEIGHT: 182.75 LBS | BODY MASS INDEX: 32.38 KG/M2

## 2019-07-15 DIAGNOSIS — R11.10 EMESIS: ICD-10-CM

## 2019-07-15 DIAGNOSIS — K52.9 COLITIS: Primary | ICD-10-CM

## 2019-07-15 LAB
ALBUMIN SERPL BCP-MCNC: 4.2 G/DL (ref 3.5–5.2)
ALP SERPL-CCNC: 104 U/L (ref 55–135)
ALT SERPL W/O P-5'-P-CCNC: 25 U/L (ref 10–44)
ANION GAP SERPL CALC-SCNC: 12 MMOL/L (ref 8–16)
AST SERPL-CCNC: 23 U/L (ref 10–40)
BASOPHILS # BLD AUTO: 0.04 K/UL (ref 0–0.2)
BASOPHILS NFR BLD: 0.4 % (ref 0–1.9)
BILIRUB SERPL-MCNC: 0.2 MG/DL (ref 0.1–1)
BILIRUB UR QL STRIP: NEGATIVE
BUN SERPL-MCNC: 10 MG/DL (ref 6–20)
CALCIUM SERPL-MCNC: 9.6 MG/DL (ref 8.7–10.5)
CHLORIDE SERPL-SCNC: 105 MMOL/L (ref 95–110)
CLARITY UR: CLEAR
CO2 SERPL-SCNC: 24 MMOL/L (ref 23–29)
COLOR UR: YELLOW
CREAT SERPL-MCNC: 0.8 MG/DL (ref 0.5–1.4)
DIFFERENTIAL METHOD: ABNORMAL
EOSINOPHIL # BLD AUTO: 0.6 K/UL (ref 0–0.5)
EOSINOPHIL NFR BLD: 6.2 % (ref 0–8)
ERYTHROCYTE [DISTWIDTH] IN BLOOD BY AUTOMATED COUNT: 18.6 % (ref 11.5–14.5)
EST. GFR  (AFRICAN AMERICAN): >60 ML/MIN/1.73 M^2
EST. GFR  (NON AFRICAN AMERICAN): >60 ML/MIN/1.73 M^2
GLUCOSE SERPL-MCNC: 142 MG/DL (ref 70–110)
GLUCOSE UR QL STRIP: NEGATIVE
HCT VFR BLD AUTO: 38.1 % (ref 37–48.5)
HGB BLD-MCNC: 12.4 G/DL (ref 12–16)
HGB UR QL STRIP: ABNORMAL
HIV 1+2 AB+HIV1 P24 AG SERPL QL IA: NEGATIVE
KETONES UR QL STRIP: NEGATIVE
LEUKOCYTE ESTERASE UR QL STRIP: NEGATIVE
LIPASE SERPL-CCNC: 229 U/L (ref 4–60)
LYMPHOCYTES # BLD AUTO: 2.5 K/UL (ref 1–4.8)
LYMPHOCYTES NFR BLD: 26.9 % (ref 18–48)
MCH RBC QN AUTO: 25.7 PG (ref 27–31)
MCHC RBC AUTO-ENTMCNC: 32.5 G/DL (ref 32–36)
MCV RBC AUTO: 79 FL (ref 82–98)
MICROSCOPIC COMMENT: NORMAL
MONOCYTES # BLD AUTO: 0.6 K/UL (ref 0.3–1)
MONOCYTES NFR BLD: 6.3 % (ref 4–15)
NEUTROPHILS # BLD AUTO: 5.6 K/UL (ref 1.8–7.7)
NEUTROPHILS NFR BLD: 60.4 % (ref 38–73)
NITRITE UR QL STRIP: NEGATIVE
PH UR STRIP: 6 [PH] (ref 5–8)
PLATELET # BLD AUTO: 307 K/UL (ref 150–350)
PMV BLD AUTO: 9.6 FL (ref 9.2–12.9)
POCT GLUCOSE: 124 MG/DL (ref 70–110)
POTASSIUM SERPL-SCNC: 3.6 MMOL/L (ref 3.5–5.1)
PROT SERPL-MCNC: 8.3 G/DL (ref 6–8.4)
PROT UR QL STRIP: NEGATIVE
RBC # BLD AUTO: 4.83 M/UL (ref 4–5.4)
RBC #/AREA URNS HPF: 0 /HPF (ref 0–4)
SODIUM SERPL-SCNC: 141 MMOL/L (ref 136–145)
SP GR UR STRIP: 1.01 (ref 1–1.03)
URN SPEC COLLECT METH UR: ABNORMAL
UROBILINOGEN UR STRIP-ACNC: NEGATIVE EU/DL
WBC # BLD AUTO: 9.31 K/UL (ref 3.9–12.7)

## 2019-07-15 PROCEDURE — 99285 EMERGENCY DEPT VISIT HI MDM: CPT | Mod: 25

## 2019-07-15 PROCEDURE — 25500020 PHARM REV CODE 255: Performed by: EMERGENCY MEDICINE

## 2019-07-15 PROCEDURE — 63600175 PHARM REV CODE 636 W HCPCS: Performed by: EMERGENCY MEDICINE

## 2019-07-15 PROCEDURE — 36415 COLL VENOUS BLD VENIPUNCTURE: CPT

## 2019-07-15 PROCEDURE — 86703 HIV-1/HIV-2 1 RESULT ANTBDY: CPT

## 2019-07-15 PROCEDURE — 96361 HYDRATE IV INFUSION ADD-ON: CPT

## 2019-07-15 PROCEDURE — 25000003 PHARM REV CODE 250: Performed by: EMERGENCY MEDICINE

## 2019-07-15 PROCEDURE — 80053 COMPREHEN METABOLIC PANEL: CPT

## 2019-07-15 PROCEDURE — 93010 EKG 12-LEAD: ICD-10-PCS | Mod: ,,, | Performed by: INTERNAL MEDICINE

## 2019-07-15 PROCEDURE — 93005 ELECTROCARDIOGRAM TRACING: CPT

## 2019-07-15 PROCEDURE — 93010 ELECTROCARDIOGRAM REPORT: CPT | Mod: ,,, | Performed by: INTERNAL MEDICINE

## 2019-07-15 PROCEDURE — 83690 ASSAY OF LIPASE: CPT

## 2019-07-15 PROCEDURE — 85025 COMPLETE CBC W/AUTO DIFF WBC: CPT

## 2019-07-15 PROCEDURE — 81000 URINALYSIS NONAUTO W/SCOPE: CPT

## 2019-07-15 PROCEDURE — 96374 THER/PROPH/DIAG INJ IV PUSH: CPT | Mod: 59

## 2019-07-15 RX ORDER — ONDANSETRON 4 MG/1
4 TABLET, FILM COATED ORAL EVERY 8 HOURS PRN
Qty: 12 TABLET | Refills: 0 | Status: SHIPPED | OUTPATIENT
Start: 2019-07-15 | End: 2019-09-13

## 2019-07-15 RX ORDER — ONDANSETRON 2 MG/ML
4 INJECTION INTRAMUSCULAR; INTRAVENOUS
Status: COMPLETED | OUTPATIENT
Start: 2019-07-15 | End: 2019-07-15

## 2019-07-15 RX ADMIN — IOHEXOL 75 ML: 350 INJECTION, SOLUTION INTRAVENOUS at 02:07

## 2019-07-15 RX ADMIN — SODIUM CHLORIDE 1000 ML: 0.9 INJECTION, SOLUTION INTRAVENOUS at 10:07

## 2019-07-15 RX ADMIN — ONDANSETRON 4 MG: 2 INJECTION INTRAMUSCULAR; INTRAVENOUS at 10:07

## 2019-07-15 NOTE — ED PROVIDER NOTES
"SCRIBE #1 NOTE: I, Corinne Mack, am scribing for, and in the presence of, Jimbo Chun MD. I have scribed the entire note.      History      Chief Complaint   Patient presents with    Emesis     started at 6:30 this am, checked glucose at home was 165. states just feeling bad.       Review of patient's allergies indicates:   Allergen Reactions    Morphine Itching        HPI   HPI    7/15/2019, 10:19 AM   History obtained from the patient      History of Present Illness: Lani Hurst is a 49 y.o. female patient with PMHx of anemia who presents to the Emergency Department for emesis which onset 2 hours ago. Pt checked her blood sugar at home and it was 165. Pt states she "just feels bad". Symptoms are episodic and moderate in severity. No mitigating or exacerbating factors reported. Associated sxs include nausea. Patient denies any fever, chills, CP, SOB, diarrhea, abd pain, back pain, neck pain, dysuria, hematuria, HA, dizziness, and all other sxs at this time. No prior Tx reported. No further complaints or concerns at this time.         Arrival mode: Personal vehicle    PCP: Primary Doctor No       Past Medical History:  Past Medical History:   Diagnosis Date    Anemia     Heart murmur     History of blood transfusion     Mitral valve prolapse     Ovarian torsion        Past Surgical History:  Past Surgical History:   Procedure Laterality Date    APPENDECTOMY       SECTION      HYSTERECTOMY      HYSTERECTOMY, TOTAL, LAPAROSCOPIC N/A 10/16/2018    Performed by [facility]ro MD at [facility] OR    Left ovary and follopian tube removal       s/p ovarian torsion    SALPINGO-OOPHORECTOMY, LAPAROSCOPIC Right 10/16/2018    Performed by [facility] Carrero MD at [facility] OR         Family History:  Family History   Problem Relation Age of Onset    Prostate cancer Father     Breast cancer Neg Hx     Colon cancer Neg Hx     Ovarian cancer Neg Hx        Social History:  Social History     Tobacco " Use    Smoking status: Current Every Day Smoker     Packs/day: 0.50     Types: Cigarettes    Smokeless tobacco: Never Used   Substance and Sexual Activity    Alcohol use: Yes     Comment: occasionally    Drug use: Yes     Types: Marijuana    Sexual activity: Yes     Partners: Male     Birth control/protection: None       ROS   Review of Systems   Constitutional: Negative for chills and fever.   Respiratory: Negative for cough and shortness of breath.    Cardiovascular: Negative for chest pain and leg swelling.   Gastrointestinal: Positive for nausea and vomiting. Negative for abdominal pain and diarrhea.   Genitourinary: Negative for dysuria, flank pain and hematuria.   Musculoskeletal: Negative for back pain, neck pain and neck stiffness.   Skin: Negative for rash and wound.   Neurological: Negative for dizziness, light-headedness, numbness and headaches.   All other systems reviewed and are negative.    Physical Exam      Initial Vitals [07/15/19 1002]   BP Pulse Resp Temp SpO2   (!) 157/84 77 16 97.9 °F (36.6 °C) 99 %      MAP       --          Physical Exam  Nursing Notes and Vital Signs Reviewed.  Constitutional: Patient is in no acute distress. Well-developed and well-nourished.  Head: Atraumatic. Normocephalic.  Eyes: PERRL. EOM intact. Conjunctivae are not pale. No scleral icterus.  ENT: Mucous membranes are moist. Oropharynx is clear and symmetric.    Neck: Supple. Full ROM. No lymphadenopathy.  Cardiovascular: Regular rate. Regular rhythm. No murmurs, rubs, or gallops. Distal pulses are 2+ and symmetric.  Pulmonary/Chest: No respiratory distress. Clear to auscultation bilaterally. No wheezing or rales.  Abdominal: Soft and non-distended.  There is no tenderness.  No rebound, guarding, or rigidity. Good bowel sounds.  Genitourinary: No CVA tenderness  Musculoskeletal: Moves all extremities. No obvious deformities. No edema.   Skin: Warm and dry.  Neurological:  Alert, awake, and appropriate.  Normal  "speech.  No acute focal neurological deficits are appreciated.  Psychiatric: Normal affect. Good eye contact. Appropriate in content.    ED Course    Procedures  ED Vital Signs:  Vitals:    07/15/19 1002 07/15/19 1050 07/15/19 1140   BP: (!) 157/84 (!) 159/78 129/60   Pulse: 77 72 75   Resp: 16 16 20   Temp: 97.9 °F (36.6 °C)     TempSrc: Oral     SpO2: 99% 100%    Weight: 82.9 kg (182 lb 12.2 oz)     Height: 5' 3" (1.6 m)         Abnormal Lab Results:  Labs Reviewed   CBC W/ AUTO DIFFERENTIAL - Abnormal; Notable for the following components:       Result Value    Mean Corpuscular Volume 79 (*)     Mean Corpuscular Hemoglobin 25.7 (*)     RDW 18.6 (*)     Eos # 0.6 (*)     All other components within normal limits   COMPREHENSIVE METABOLIC PANEL - Abnormal; Notable for the following components:    Glucose 142 (*)     All other components within normal limits   LIPASE - Abnormal; Notable for the following components:    Lipase 229 (*)     All other components within normal limits   URINALYSIS, REFLEX TO URINE CULTURE - Abnormal; Notable for the following components:    Occult Blood UA 1+ (*)     All other components within normal limits    Narrative:     Preferred Collection Type->Urine, Clean Catch   POCT GLUCOSE - Abnormal; Notable for the following components:    POCT Glucose 124 (*)     All other components within normal limits   HIV 1 / 2 ANTIBODY   URINALYSIS MICROSCOPIC    Narrative:     Preferred Collection Type->Urine, Clean Catch        All Lab Results:  Results for orders placed or performed during the hospital encounter of 07/15/19   HIV 1/2 Ag/Ab (4th Gen)   Result Value Ref Range    HIV 1/2 Ag/Ab Negative Negative   CBC W/ AUTO DIFFERENTIAL   Result Value Ref Range    WBC 9.31 3.90 - 12.70 K/uL    RBC 4.83 4.00 - 5.40 M/uL    Hemoglobin 12.4 12.0 - 16.0 g/dL    Hematocrit 38.1 37.0 - 48.5 %    Mean Corpuscular Volume 79 (L) 82 - 98 fL    Mean Corpuscular Hemoglobin 25.7 (L) 27.0 - 31.0 pg    Mean " Corpuscular Hemoglobin Conc 32.5 32.0 - 36.0 g/dL    RDW 18.6 (H) 11.5 - 14.5 %    Platelets 307 150 - 350 K/uL    MPV 9.6 9.2 - 12.9 fL    Gran # (ANC) 5.6 1.8 - 7.7 K/uL    Lymph # 2.5 1.0 - 4.8 K/uL    Mono # 0.6 0.3 - 1.0 K/uL    Eos # 0.6 (H) 0.0 - 0.5 K/uL    Baso # 0.04 0.00 - 0.20 K/uL    Gran% 60.4 38.0 - 73.0 %    Lymph% 26.9 18.0 - 48.0 %    Mono% 6.3 4.0 - 15.0 %    Eosinophil% 6.2 0.0 - 8.0 %    Basophil% 0.4 0.0 - 1.9 %    Differential Method Automated    Comp. Metabolic Panel   Result Value Ref Range    Sodium 141 136 - 145 mmol/L    Potassium 3.6 3.5 - 5.1 mmol/L    Chloride 105 95 - 110 mmol/L    CO2 24 23 - 29 mmol/L    Glucose 142 (H) 70 - 110 mg/dL    BUN, Bld 10 6 - 20 mg/dL    Creatinine 0.8 0.5 - 1.4 mg/dL    Calcium 9.6 8.7 - 10.5 mg/dL    Total Protein 8.3 6.0 - 8.4 g/dL    Albumin 4.2 3.5 - 5.2 g/dL    Total Bilirubin 0.2 0.1 - 1.0 mg/dL    Alkaline Phosphatase 104 55 - 135 U/L    AST 23 10 - 40 U/L    ALT 25 10 - 44 U/L    Anion Gap 12 8 - 16 mmol/L    eGFR if African American >60 >60 mL/min/1.73 m^2    eGFR if non African American >60 >60 mL/min/1.73 m^2   Lipase   Result Value Ref Range    Lipase 229 (H) 4 - 60 U/L   Urinalysis, Reflex to Urine Culture Urine, Clean Catch   Result Value Ref Range    Specimen UA Urine, Clean Catch     Color, UA Yellow Yellow, Straw, Sumi    Appearance, UA Clear Clear    pH, UA 6.0 5.0 - 8.0    Specific Gravity, UA 1.015 1.005 - 1.030    Protein, UA Negative Negative    Glucose, UA Negative Negative    Ketones, UA Negative Negative    Bilirubin (UA) Negative Negative    Occult Blood UA 1+ (A) Negative    Nitrite, UA Negative Negative    Urobilinogen, UA Negative <2.0 EU/dL    Leukocytes, UA Negative Negative   Urinalysis Microscopic   Result Value Ref Range    RBC, UA 0 0 - 4 /hpf    Microscopic Comment SEE COMMENT    POCT glucose   Result Value Ref Range    POCT Glucose 124 (H) 70 - 110 mg/dL       Imaging Results:  Imaging Results          CT Abdomen  Pelvis With Contrast (Final result)  Result time 07/15/19 14:19:31    Final result by Yahir Arellano MD (07/15/19 14:19:31)                 Impression:      Mild thickening seen throughout the colon which could reflect mild infectious or inflammatory colitis.    3.3 x 2 cm left adrenal nodule which is indeterminate but appears similar to prior exam. Consider follow-up noncontrast CT abdomen or MRI for further characterization.    All CT scans at this facility use dose modulation, iterative reconstruction, and/or weight based dosing when appropriate to reduce radiation dose to as low as reasonable achievable.      Electronically signed by: Yahir Arellano MD  Date:    07/15/2019  Time:    14:19             Narrative:    EXAMINATION:  CT ABDOMEN PELVIS WITH CONTRAST    CLINICAL HISTORY:  Nausea, vomiting, diarrhea;pancreatitis;    TECHNIQUE:  Low dose axial images, sagittal and coronal reformations were obtained from the lung bases to the pubic symphysis following the IV administration of 75 mL of Omnipaque 350.  Oral contrast was not administered.    COMPARISON:  10/23/2018    FINDINGS:  Heart: Normal size. No effusion.    Lung Bases: Clear.    Liver: Normal size and attenuation. No focal lesions.  Minimal focal fat is seen along the falciform ligament of the left hepatic lobe.  Subcentimeter hypodensities within the liver dome are indeterminate but likely reflect small cysts.    Gallbladder: No calcified gallstones.    Bile Ducts: No dilatation.    Pancreas: No mass. No peripancreatic fat stranding.    Spleen: Normal.    Adrenals: 3.3 x 2 cm left adrenal nodule which is indeterminate but appears similar to prior exam.  Consider follow-up noncontrast CT abdomen or MRI for further characterization.  Right adrenal gland is normal in appearance.    Kidneys/Ureters: Normal enhancement.  No mass or  hydroureteronephrosis.    Bladder: No wall thickening.    Reproductive organs: Post hysterectomy.    GI Tract/Mesentery: No  evidence of bowel obstruction.  Mild constipation.  No evidence of acute appendicitis.  Mild thickening seen throughout the colon which could reflect mild infectious or inflammatory colitis.  Findings are most prominent within the ascending transverse and proximal descending colon.    Peritoneal Space: No ascites or free air.    Retroperitoneum: No significant adenopathy.    Abdominal wall: Normal.    Vasculature: No aneurysm.    Bones: No acute fracture. No suspicious lytic or sclerotic lesions.                               X-Ray Abdomen Flat And Erect (Final result)  Result time 07/15/19 11:10:54    Final result by Parveen Bernal MD (07/15/19 11:10:54)                 Impression:      No acute abnormality identified.      Electronically signed by: Parveen Bernal  Date:    07/15/2019  Time:    11:10             Narrative:    EXAMINATION:  XR ABDOMEN FLAT AND ERECT    CLINICAL HISTORY:  Abdominal Pain;    TECHNIQUE:  Flat and erect AP views of the abdomen were performed.    COMPARISON:  06/25/2019    FINDINGS:  No free air.  No evidence of obstruction or ileus.  No radiopaque foreign body, or abnormal calcification.  Osseous structures unremarkable.  Moderate volume stool right colon.                                 The EKG was ordered, reviewed, and independently interpreted by the ED provider.  Interpretation time: 1033  Rate: 65 BPM  Rhythm: normal sinus rhythm  Interpretation: Normal QRS. No STEMI.           The Emergency Provider reviewed the vital signs and test results, which are outlined above.    ED Discussion     2:36 PM: Reassessed pt at this time. Pt is awake, alert, and in no distress. Discussed with pt all pertinent ED information and results. Discussed pt dx and plan of tx. Gave pt all f/u and return to the ED instructions. All questions and concerns were addressed at this time. Pt expresses understanding of information and instructions, and is comfortable with plan to discharge. Pt is stable for  discharge.    I discussed with patient and/or family/caretaker that evaluation in the ED does not suggest any emergent or life threatening medical conditions requiring immediate intervention beyond what was provided in the ED, and I believe patient is safe for discharge.  Regardless, an unremarkable evaluation in the ED does not preclude the development or presence of a serious of life threatening condition. As such, patient was instructed to return immediately for any worsening or change in current symptoms.      ED Medication(s):  Medications   ondansetron injection 4 mg (4 mg Intravenous Given 7/15/19 1058)   sodium chloride 0.9% bolus 1,000 mL (1,000 mLs Intravenous New Bag 7/15/19 1058)   iohexol (OMNIPAQUE 350) injection 100 mL (75 mLs Intravenous Given 7/15/19 1408)          Medication List      START taking these medications    ondansetron 4 MG tablet  Commonly known as:  ZOFRAN  Take 1 tablet (4 mg total) by mouth every 8 (eight) hours as needed.        ASK your doctor about these medications    docusate sodium 100 MG capsule  Commonly known as:  COLACE  Take 1 capsule (100 mg total) by mouth once daily.     estradiol 0.05 mg/24 hr td ptwk 0.05 mg/24 hr Ptwk  Place 1 patch onto the skin once a week.     hydrocortisone 2.5 % cream  Apply topically 2 (two) times daily. for 10 days     polyethylene glycol 17 gram/dose powder  Commonly known as:  GLYCOLAX  Take 17 g by mouth once daily.     witch hazel 20 % Padm  Apply 1 application topically 4 (four) times daily as needed (pain).           Where to Get Your Medications      You can get these medications from any pharmacy    Bring a paper prescription for each of these medications  · ondansetron 4 MG tablet         Follow-up Information     PCP. Call in 1 day.           [facility]    Specialty:  Emergency Medicine  Why:  If symptoms worsen  Contact information:  [address]  [facility] [address] [phone]                    Medical Decision Making    Medical Decision Making:   Clinical Tests:   Lab Tests: Ordered and Reviewed  Radiological Study: Ordered and Reviewed  Medical Tests: Ordered and Reviewed     Additional MDM:   Smoking Cessation: The patient is a smoker. The patient was counseled on smoking cessation for: 3 minutes. The patient was counseled on tobacco related  health complications.        Scribe Attestation:   Scribe #1: I performed the above scribed service and the documentation accurately describes the services I performed. I attest to the accuracy of the note 07/15/2019.    Attending:   Physician Attestation Statement for Scribe #1: I, Jimbo Chun MD, personally performed the services described in this documentation, as scribed by Corinne Mack, in my presence, and it is both accurate and complete.          Clinical Impression       ICD-10-CM ICD-9-CM   1. Colitis K52.9 558.9   2. Emesis R11.10 787.03       Disposition:   Disposition: Discharged  Condition: Stable           Jimbo Chun MD  07/16/19 4179

## 2019-07-15 NOTE — ED NOTES
Patient identifiers verified and correct for Lani Hurst.    LOC: The patient is awake, alert and aware of environment with an appropriate affect, the patient is oriented x 3 and speaking appropriately.  APPEARANCE: Patient resting comfortably and in no acute distress, patient is clean and well groomed, patient's clothing is properly fastened.  SKIN: The skin is warm and dry, color consistent with ethnicity, patient has normal skin turgor and moist mucus membranes, skin intact, no breakdown or bruising noted.  MUSCULOSKELETAL: Patient moving all extremities spontaneously.  RESPIRATORY: Airway is open and patent, respirations are spontaneous.  CARDIAC: Patient has a normal rate, no periphreal edema noted, capillary refill < 3 seconds.  ABDOMEN: Soft and non tender to palpation.

## 2019-07-15 NOTE — ED NOTES
Discharge instructions explained to patient. Patient verbalizes understanding. Patient and discharge paperwork escorted to registration desk by RN at this time. Discharge paperwork given to registration for completion of discharge.

## 2019-08-28 ENCOUNTER — TELEPHONE (OUTPATIENT)
Dept: OBSTETRICS AND GYNECOLOGY | Facility: CLINIC | Age: 49
End: 2019-08-28

## 2019-08-28 NOTE — TELEPHONE ENCOUNTER
----- Message from Makeda Quiroz sent at 8/28/2019 12:54 PM CDT -----  No. [phone]    Patient has a lump in her right breast.  Please call patient.   Please call in a mammogram order for L[place].

## 2019-08-28 NOTE — TELEPHONE ENCOUNTER
Called spoke to her, she states she has a lump in her right breast more towards the crease that she noticed a week ago. She said its not painful, but does have a dry patch and itchy. She isn't due for her annual until 11/26/19.

## 2019-08-28 NOTE — TELEPHONE ENCOUNTER
Called spoke to pt about having to come in an see dr zaidi for a breast exam first before she can order the mammogram. She understood, so we scheduled her for 9/13/19 at 8:45 am.

## 2019-09-13 ENCOUNTER — TELEPHONE (OUTPATIENT)
Dept: OBSTETRICS AND GYNECOLOGY | Facility: CLINIC | Age: 49
End: 2019-09-13

## 2019-09-13 ENCOUNTER — OFFICE VISIT (OUTPATIENT)
Dept: OBSTETRICS AND GYNECOLOGY | Facility: CLINIC | Age: 49
End: 2019-09-13
Payer: MEDICAID

## 2019-09-13 VITALS
DIASTOLIC BLOOD PRESSURE: 78 MMHG | BODY MASS INDEX: 31.74 KG/M2 | HEIGHT: 63 IN | SYSTOLIC BLOOD PRESSURE: 114 MMHG | WEIGHT: 179.13 LBS

## 2019-09-13 DIAGNOSIS — R21 RASH AND OTHER NONSPECIFIC SKIN ERUPTION: ICD-10-CM

## 2019-09-13 DIAGNOSIS — Z12.4 SCREENING FOR CERVICAL CANCER: ICD-10-CM

## 2019-09-13 DIAGNOSIS — Z12.39 SCREENING FOR BREAST CANCER: ICD-10-CM

## 2019-09-13 DIAGNOSIS — Z01.419 WELL WOMAN EXAM WITH ROUTINE GYNECOLOGICAL EXAM: Primary | ICD-10-CM

## 2019-09-13 PROCEDURE — 88142 CYTOPATH C/V THIN LAYER: CPT | Performed by: PATHOLOGY

## 2019-09-13 PROCEDURE — 99999 PR PBB SHADOW E&M-EST. PATIENT-LVL III: ICD-10-PCS | Mod: PBBFAC,,, | Performed by: OBSTETRICS & GYNECOLOGY

## 2019-09-13 PROCEDURE — 88141 CYTOPATH C/V INTERPRET: CPT | Mod: ,,, | Performed by: PATHOLOGY

## 2019-09-13 PROCEDURE — 99396 PREV VISIT EST AGE 40-64: CPT | Mod: S$PBB,,, | Performed by: OBSTETRICS & GYNECOLOGY

## 2019-09-13 PROCEDURE — 99213 OFFICE O/P EST LOW 20 MIN: CPT | Mod: PBBFAC,PO | Performed by: OBSTETRICS & GYNECOLOGY

## 2019-09-13 PROCEDURE — 99396 PR PREVENTIVE VISIT,EST,40-64: ICD-10-PCS | Mod: S$PBB,,, | Performed by: OBSTETRICS & GYNECOLOGY

## 2019-09-13 PROCEDURE — 88141 LIQUID-BASED PAP SMEAR, SCREENING: ICD-10-PCS | Mod: ,,, | Performed by: PATHOLOGY

## 2019-09-13 PROCEDURE — 99999 PR PBB SHADOW E&M-EST. PATIENT-LVL III: CPT | Mod: PBBFAC,,, | Performed by: OBSTETRICS & GYNECOLOGY

## 2019-09-13 RX ORDER — TRIAMCINOLONE ACETONIDE 1 MG/G
CREAM TOPICAL
Qty: 45 G | Refills: 0 | Status: SHIPPED | OUTPATIENT
Start: 2019-09-13

## 2019-09-13 RX ORDER — CICLOPIROX OLAMINE 7.7 MG/G
CREAM TOPICAL
Qty: 30 G | Refills: 0 | Status: SHIPPED | OUTPATIENT
Start: 2019-09-13 | End: 2019-09-13 | Stop reason: SDUPTHER

## 2019-09-13 RX ORDER — ADHESIVE BANDAGE
BANDAGE TOPICAL
Qty: 118 ML | Refills: 0 | Status: SHIPPED | OUTPATIENT
Start: 2019-09-13

## 2019-09-13 RX ORDER — TRIAMCINOLONE ACETONIDE 1 MG/G
CREAM TOPICAL
Qty: 45 G | Refills: 0 | Status: SHIPPED | OUTPATIENT
Start: 2019-09-13 | End: 2019-09-13 | Stop reason: SDUPTHER

## 2019-09-13 RX ORDER — ADHESIVE BANDAGE
BANDAGE TOPICAL
Qty: 118 ML | Refills: 0 | Status: SHIPPED | OUTPATIENT
Start: 2019-09-13 | End: 2019-09-13 | Stop reason: SDUPTHER

## 2019-09-13 RX ORDER — CICLOPIROX OLAMINE 7.7 MG/G
CREAM TOPICAL
Qty: 30 G | Refills: 0 | Status: SHIPPED | OUTPATIENT
Start: 2019-09-13

## 2019-09-13 NOTE — PROGRESS NOTES
GYNECOLOGY OFFICE NOTE    Reason for visit: annual    HPI: Pt is a 49 y.o.  female  who presents for annual. Pt with hx of TLH/USO with hx of CIN3. Needs pap still. Reports feeling a lump in her right breast that is no longer present. Gets itchy rash under breast. She is sexually active. She denies vaginal discharge. Last pap: 2018. Last MMG 2018- negative.     Past Medical History:   Diagnosis Date    Anemia     Heart murmur     History of blood transfusion     Mitral valve prolapse     Ovarian torsion        Past Surgical History:   Procedure Laterality Date    APPENDECTOMY       SECTION      HYSTERECTOMY      HYSTERECTOMY, TOTAL, LAPAROSCOPIC N/A 10/16/2018    Performed by [facility] MD at [facility] OR    Left ovary and follopian tube removal       s/p ovarian torsion    SALPINGO-OOPHORECTOMY, LAPAROSCOPIC Right 10/16/2018    Performed by [facility]ro MD at [facility] OR       Family History   Problem Relation Age of Onset    Prostate cancer Father     Breast cancer Neg Hx     Colon cancer Neg Hx     Ovarian cancer Neg Hx        Social History     Tobacco Use    Smoking status: Current Every Day Smoker     Packs/day: 0.50     Types: Cigarettes    Smokeless tobacco: Never Used   Substance Use Topics    Alcohol use: Yes     Comment: occasionally    Drug use: Yes     Types: Marijuana       OB History    Para Term  AB Living   2 1 0   1 1   SAB TAB Ectopic Multiple Live Births   1       1      # Outcome Date GA Lbr Maximilian/2nd Weight Sex Delivery Anes PTL Lv   2 Para     F CS-Unspec   BRAYAN   1 SAB  16w0d              Current Outpatient Medications   Medication Sig    ciclopirox (LOPROX) 0.77 % Crea Use twice daily as needed. Apply then blow dry on cool setting until dry    docusate sodium (COLACE) 100 MG capsule Take 1 capsule (100 mg total) by mouth once daily.    hydrocortisone 2.5 % cream Apply topically 2 (two) times daily. for 10 days    magnesium  "hydroxide 400 mg/5 ml (GELLER MILK OF MAGNESIA) 400 mg/5 mL Susp Use twice daily as needed. Apply then blow dry on cool setting until dry    polyethylene glycol (GLYCOLAX) 17 gram/dose powder Take 17 g by mouth once daily.    triamcinolone acetonide 0.1% (KENALOG) 0.1 % cream Use twice daily as needed. Apply then blow dry on cool setting until dry    witch hazel 20 % PadM Apply 1 application topically 4 (four) times daily as needed (pain).     No current facility-administered medications for this visit.        Allergies: Morphine     /78   Ht 5' 3" (1.6 m)   Wt 81.3 kg (179 lb 2 oz)   LMP  (LMP Unknown)   BMI 31.73 kg/m²     ROS:  GENERAL: Denies fever or chills.   SKIN: Denies rash or lesions.   HEAD: Denies head injury or headache.   CHEST: Denies chest pain or shortness of breath.   CARDIOVASCULAR: Denies palpitations or chest pain.   ABDOMEN: No constipation, diarrhea, nausea, vomiting or rectal bleeding.   URINARY: No dysuria, hematuria, or burning on urination.  REPRODUCTIVE: See HPI.   BREASTS: see HPI  NEUROLOGIC: Denies syncope or weakness.     Physical Exam:  GENERAL: alert, appears stated age and cooperative  NEUROLOGIC: orientated to person, place and time, normal mood and affect   CHEST: Normal respiratory effort  NECK: normal appearance  SKIN: no acne, hirsutism  BREAST EXAM: breasts appear normal, no suspicious masses, no skin or nipple changes or axillary nodes. Rash under both breast-hyperkeratosis from scratching  ABDOMEN: abdomen is soft without significant tenderness, masses  EXTERNAL GENITALIA:  normal general appearance  URETHRA: normal urethra, normal urethral meatus  VAGINA:  normal without tenderness, induration or masses  CERVIX:  absent cervix  UTERUS:  surgically absent  ADNEXA:  normal adnexa, nontender and no masses    Diagnosis:  1. Well woman exam with routine gynecological exam    2. Screening for cervical cancer    3. Screening for breast cancer    4. Rash and other " nonspecific skin eruption        Plan:   1. Annual  2. Pap today  3. MMG ordered normal CBE  4. Ointment for area under breast. If it persist will need to see dermatology    Orders Placed This Encounter    Mammo Digital Screening Bilat w/ Lee    Liquid-based pap smear, screening    ciclopirox (LOPROX) 0.77 % Crea    triamcinolone acetonide 0.1% (KENALOG) 0.1 % cream    magnesium hydroxide 400 mg/5 ml (GELLER MILK OF MAGNESIA) 400 mg/5 mL Susp       Patient was counseled today on the new ACS guidelines for cervical cytology screening as well as the current recommendations for breast cancer screening. She was counseled to follow up with her PCP for other routine health maintenance.     Follow up in about 1 year (around 9/13/2020) for annual.      Naty Carrero MD  OB/GYN  Pager: [phone]

## 2019-09-13 NOTE — TELEPHONE ENCOUNTER
----- Message from Sue Broderick sent at 9/13/2019 11:34 AM CDT -----  Contact: [facility]  No [phone] Needs clarification on compound requested for prescription. It came over as three separate requests and she needs to know the correct dosing amounts.

## 2019-09-13 NOTE — TELEPHONE ENCOUNTER
----- Message from Miranda Hutchinson sent at 9/13/2019  1:21 PM CDT -----  Marilyn from [facility] can be reached at [phone]      Marilyn is calling to speak with the nurse to confirm scripts for triamcinolone acetonide 0.1% (KENALOG) 0.1 % cream and magnesium hydroxide 400 mg/5 ml (GELLER MILK OF MAGNESIA) 400 mg/5 mL Susp  ( compound)    Please give a call back    Thank you!

## 2019-09-13 NOTE — TELEPHONE ENCOUNTER
Called  [facility], the number that was provided for me to call back at, was sent to [employer] to leave a call back number . I left clinic number to return call.

## 2019-10-05 ENCOUNTER — HOSPITAL ENCOUNTER (EMERGENCY)
Facility: HOSPITAL | Age: 49
Discharge: HOME | End: 2019-10-05
Attending: EMERGENCY MEDICINE
Payer: MEDICAID

## 2019-10-05 VITALS
HEART RATE: 82 BPM | HEIGHT: 63 IN | SYSTOLIC BLOOD PRESSURE: 129 MMHG | BODY MASS INDEX: 32.46 KG/M2 | WEIGHT: 183.19 LBS | RESPIRATION RATE: 16 BRPM | TEMPERATURE: 98 F | DIASTOLIC BLOOD PRESSURE: 59 MMHG | OXYGEN SATURATION: 100 %

## 2019-10-05 DIAGNOSIS — K64.4 EXTERNAL HEMORRHOIDS: Primary | ICD-10-CM

## 2019-10-05 PROCEDURE — 25000003 PHARM REV CODE 250: Performed by: EMERGENCY MEDICINE

## 2019-10-05 PROCEDURE — 99283 EMERGENCY DEPT VISIT LOW MDM: CPT

## 2019-10-05 RX ORDER — LIDOCAINE HYDROCHLORIDE 20 MG/ML
10 JELLY TOPICAL
Status: COMPLETED | OUTPATIENT
Start: 2019-10-05 | End: 2019-10-05

## 2019-10-05 RX ORDER — HYDROCORTISONE 25 MG/G
CREAM TOPICAL 3 TIMES DAILY
Qty: 28 G | Refills: 0 | Status: SHIPPED | OUTPATIENT
Start: 2019-10-05 | End: 2019-10-15

## 2019-10-05 RX ORDER — LIDOCAINE HYDROCHLORIDE 20 MG/ML
10 JELLY TOPICAL
Status: DISCONTINUED | OUTPATIENT
Start: 2019-10-05 | End: 2019-10-05

## 2019-10-05 RX ADMIN — LIDOCAINE HYDROCHLORIDE 10 ML: 20 JELLY TOPICAL at 03:10

## 2019-10-05 NOTE — ED NOTES
MD provided instructions for pt. In regards to pain control:  -sitz baths  -preparation H (continue)  -aleve 2 tablets every 12hr  -tylenol 2 tablets every 6hr  RN hand wrote these instructions on pt's DC paperwork, but also explained them to pt. Pt. Verbalized understanding.  DC indicated per MD. DC instructions explained to pt., who verbalized understanding. NAD, VSS, resp. E/u. AAOx4. Ambulatory out of ED with even, steady gait; accompanied by spouse. Copy of DC instructions provided to registration team member to give to patient with checkout. Pt. At registration desk for checkout.

## 2019-10-05 NOTE — ED NOTES
Pt. Self-administered lidocaine cream to affected area. Pt. Requesting for RN to ask MD what she can take at home for the pain. Will notify MD.

## 2019-10-05 NOTE — ED NOTES
Needed two tubes of lidocaine HCL as ordered (5mL each) and accidentally pulled only one tube. Medication re-ordered by RN to allow RN to pull second tube needed.

## 2019-10-05 NOTE — ED PROVIDER NOTES
"SCRIBE #1 NOTE: I, Brenton Cabrera, am scribing for, and in the presence of, Gavin Mejia MD. I have scribed the entire note.       History     Chief Complaint   Patient presents with    Hemorrhoids     Pt. states, "My hemorrhoids are massively inflamed and hanging. They have been bleeding for the last ten days." Pt. reports no relief despite using preparation H and witch hazel.     Review of patient's allergies indicates:   Allergen Reactions    Morphine Itching         History of Present Illness     HPI    10/5/2019, 2:23 AM  History obtained from the patient       History of Present Illness: Lani Hurst is a 49 y.o. female patient who presents to the Emergency Department for evaluation of inflamed hemorrhoids which onset gradually 2 weeks ago. Pt reports they have been bleeding. Symptoms are constant and moderate in severity. No mitigating or exacerbating factors reported. No associated sxs reported. Patient denies any CP, SOB, fever, chills, HA, N/V/D, and all other sxs at this time. Prior Tx includes preparation H and witch hazel without relief. No further complaints or concerns at this time.         Arrival mode: Personal vehicle    PCP: Primary Doctor No        Past Medical History:  Past Medical History:   Diagnosis Date    Anemia     Heart murmur     History of blood transfusion     Mitral valve prolapse     Ovarian torsion        Past Surgical History:  Past Surgical History:   Procedure Laterality Date    APPENDECTOMY       SECTION      HYSTERECTOMY      LAPAROSCOPIC SALPINGO-OOPHORECTOMY Right 10/16/2018    Procedure: SALPINGO-OOPHORECTOMY, LAPAROSCOPIC;  Surgeon: Naty Carrero MD;  Location: [facility] OR;  Service: OB/GYN;  Laterality: Right;    LAPAROSCOPIC TOTAL HYSTERECTOMY N/A 10/16/2018    Procedure: HYSTERECTOMY, TOTAL, LAPAROSCOPIC;  Surgeon: Naty Carrero MD;  Location: [facility] OR;  Service: OB/GYN;  Laterality: N/A;  VIDEO    Left ovary and follopian tube removal    "    s/p ovarian torsion         Family History:  Family History   Problem Relation Age of Onset    Prostate cancer Father     Breast cancer Neg Hx     Colon cancer Neg Hx     Ovarian cancer Neg Hx        Social History:  Social History     Tobacco Use    Smoking status: Current Every Day Smoker     Packs/day: 0.50     Types: Cigarettes    Smokeless tobacco: Never Used   Substance and Sexual Activity    Alcohol use: Yes     Comment: occasionally    Drug use: Yes     Types: Marijuana    Sexual activity: Yes     Partners: Male     Birth control/protection: None        Review of Systems     Review of Systems   Constitutional: Negative for chills and fever.   HENT: Negative for sore throat.    Respiratory: Negative for shortness of breath.    Cardiovascular: Negative for chest pain.   Gastrointestinal: Negative for diarrhea, nausea and vomiting.   Genitourinary: Negative for dysuria.   Musculoskeletal: Negative for back pain.   Skin: Negative for rash.        (+) hemorrhoids   Neurological: Negative for weakness and headaches.   Hematological: Does not bruise/bleed easily.   All other systems reviewed and are negative.       Physical Exam     Initial Vitals [10/05/19 0204]   BP Pulse Resp Temp SpO2   (!) 147/70 76 16 97.7 °F (36.5 °C) 100 %      MAP       --          Physical Exam  Nursing Notes and Vital Signs Reviewed.  Constitutional: Patient is in no acute distress. Well-developed and well-nourished.  Head: Atraumatic. Normocephalic.  Eyes: PERRL. EOM intact. Conjunctivae are not pale. No scleral icterus.  ENT: Mucous membranes are moist. Oropharynx is clear and symmetric.    Neck: Supple. Full ROM. No lymphadenopathy.  Cardiovascular: Regular rate. Regular rhythm. No murmurs, rubs, or gallops. Distal pulses are 2+ and symmetric.  Pulmonary/Chest: No respiratory distress. Clear to auscultation bilaterally. No wheezing or rales.  Abdominal: Soft and non-distended.  There is no tenderness.  No rebound,  "guarding, or rigidity. Good bowel sounds.  Genitourinary: No CVA tenderness  Musculoskeletal: Moves all extremities. No obvious deformities. No edema. No calf tenderness.  Skin: Warm and dry.  Neurological:  Alert, awake, and appropriate.  Normal speech.  No acute focal neurological deficits are appreciated.  Psychiatric: Normal affect. Good eye contact. Appropriate in content.  Rectal: Female chaperone present for the duration of the rectal exam. There are 2 large and 1 small external hemorrhoids. Tender to touch, inflamed, not thrombosed.       ED Course   Procedures  ED Vital Signs:  Vitals:    10/05/19 0204 10/05/19 0315   BP: (!) 147/70 (!) 129/59   Pulse: 76 82   Resp: 16 16   Temp: 97.7 °F (36.5 °C)    SpO2: 100% 100%   Weight: 83.1 kg (183 lb 3.2 oz)    Height: 5' 2.5" (1.588 m)        Abnormal Lab Results:  Labs Reviewed - No data to display     All Lab Results:  None    Imaging Results:  Imaging Results    None                   The Emergency Provider reviewed the vital signs and test results, which are outlined above.     ED Discussion       2:30 AM: Reassessed pt at this time.  Pt states her condition has improved at this time. Discussed with pt all pertinent ED information and results. Discussed pt dx and plan of tx. Gave pt all f/u and return to the ED instructions. All questions and concerns were addressed at this time. Pt expresses understanding of information and instructions, and is comfortable with plan to discharge. Pt is stable for discharge.    I discussed with patient and/or family/caretaker that evaluation in the ED does not suggest any emergent or life threatening medical conditions requiring immediate intervention beyond what was provided in the ED, and I believe patient is safe for discharge.  Regardless, an unremarkable evaluation in the ED does not preclude the development or presence of a serious of life threatening condition. As such, patient was instructed to return immediately for " any worsening or change in current symptoms.                   ED Medication(s):  Medications   lidocaine HCL 2% jelly 10 mL (10 mLs Topical (Top) Given 10/5/19 0314)       Discharge Medication List as of 10/5/2019  2:29 AM      START taking these medications    Details   hydrocortisone 2.5 % cream Apply topically 3 (three) times daily. for 10 days, Starting Sat 10/5/2019, Until Tue 10/15/2019, Print             Follow-up Information     Schedule an appointment as soon as possible for a visit  with Joanna Moore MD.    Specialties:  Gastroenterology, Internal Medicine  Contact information:  [address] [place] [address] [address] Mariz[address] BRYSON [address]  [phone]                       Scribe Attestation:   Scribe #1: I performed the above scribed service and the documentation accurately describes the services I performed. I attest to the accuracy of the note.     Attending:   Physician Attestation Statement for Scribe #1: I, Gavin Mejia MD, personally performed the services described in this documentation, as scribed by Brenton Rees, in my presence, and it is both accurate and complete.           Clinical Impression       ICD-10-CM ICD-9-CM   1. External hemorrhoids K64.4 455.3       Disposition:   Disposition: Discharged  Condition: Stable         Gavin Mejia MD  10/05/19 8830

## 2019-12-12 ENCOUNTER — TELEPHONE (OUTPATIENT)
Dept: OBSTETRICS AND GYNECOLOGY | Facility: CLINIC | Age: 49
End: 2019-12-12

## 2019-12-12 NOTE — TELEPHONE ENCOUNTER
----- Message from Rubi Pfeiffer sent at 12/12/2019  9:01 AM CST -----  Contact: [phone]/zngp   Patient states she is returning your call. Please advise.

## 2020-01-09 ENCOUNTER — TELEPHONE (OUTPATIENT)
Dept: OBSTETRICS AND GYNECOLOGY | Facility: CLINIC | Age: 50
End: 2020-01-09

## 2020-01-09 RX ORDER — FLUCONAZOLE 150 MG/1
150 TABLET ORAL
Qty: 2 TABLET | Refills: 0 | Status: SHIPPED | OUTPATIENT
Start: 2020-01-09 | End: 2020-01-13

## 2020-01-09 NOTE — TELEPHONE ENCOUNTER
----- Message from Rubi Pfeiffer sent at 1/9/2020  9:26 AM CST -----  Contact: [phone]/self   Patient is requesting to speak with nurse regarding test results. Please advise.

## 2020-01-09 NOTE — TELEPHONE ENCOUNTER
Called spoke to pt about rescheduling another pap smear due to not having enough cells . She is scheduled for 2-4-20 @ 1030 am.

## 2020-01-09 NOTE — TELEPHONE ENCOUNTER
I had to call Ms hartman earlier about coming in to redo a pap smear and she said she has a yeast infection with a lot of irriation and would like something for it. I told her I would send the on call dr a message. Please advise. Thank you

## 2020-01-09 NOTE — TELEPHONE ENCOUNTER
Called spoke to pt, to let her know the on call  sent diflucan over to the pharmacy for her. She voiced understanding

## 2021-04-29 ENCOUNTER — PATIENT MESSAGE (OUTPATIENT)
Dept: RESEARCH | Facility: HOSPITAL | Age: 51
End: 2021-04-29

## 2022-11-08 LAB — POCT GLUCOSE: 333 MG/DL (ref 70–110)

## 2022-11-08 PROCEDURE — 93005 ELECTROCARDIOGRAM TRACING: CPT

## 2022-11-08 PROCEDURE — 96375 TX/PRO/DX INJ NEW DRUG ADDON: CPT

## 2022-11-08 PROCEDURE — 93010 ELECTROCARDIOGRAM REPORT: CPT | Mod: 76,,, | Performed by: INTERNAL MEDICINE

## 2022-11-08 PROCEDURE — 96361 HYDRATE IV INFUSION ADD-ON: CPT

## 2022-11-08 PROCEDURE — 93010 ELECTROCARDIOGRAM REPORT: CPT | Mod: ,,, | Performed by: INTERNAL MEDICINE

## 2022-11-08 PROCEDURE — 96374 THER/PROPH/DIAG INJ IV PUSH: CPT

## 2022-11-08 PROCEDURE — 99285 EMERGENCY DEPT VISIT HI MDM: CPT | Mod: 25

## 2022-11-08 PROCEDURE — 93010 EKG 12-LEAD: ICD-10-PCS | Mod: 76,,, | Performed by: INTERNAL MEDICINE

## 2022-11-09 ENCOUNTER — HOSPITAL ENCOUNTER (EMERGENCY)
Facility: HOSPITAL | Age: 52
Discharge: HOME | End: 2022-11-09
Attending: EMERGENCY MEDICINE

## 2022-11-09 VITALS
OXYGEN SATURATION: 98 % | RESPIRATION RATE: 18 BRPM | TEMPERATURE: 98 F | SYSTOLIC BLOOD PRESSURE: 148 MMHG | DIASTOLIC BLOOD PRESSURE: 80 MMHG | HEART RATE: 85 BPM

## 2022-11-09 DIAGNOSIS — K52.9 COLITIS: ICD-10-CM

## 2022-11-09 DIAGNOSIS — R10.9 RIGHT SIDED ABDOMINAL PAIN: ICD-10-CM

## 2022-11-09 DIAGNOSIS — E13.9 OTHER SPECIFIED DIABETES MELLITUS WITHOUT COMPLICATION, WITHOUT LONG-TERM CURRENT USE OF INSULIN: ICD-10-CM

## 2022-11-09 DIAGNOSIS — R73.9 HYPERGLYCEMIA: Primary | ICD-10-CM

## 2022-11-09 LAB
ALBUMIN SERPL BCP-MCNC: 3.7 G/DL (ref 3.5–5.2)
ALP SERPL-CCNC: 132 U/L (ref 55–135)
ALT SERPL W/O P-5'-P-CCNC: 15 U/L (ref 10–44)
ANION GAP SERPL CALC-SCNC: 12 MMOL/L (ref 8–16)
AST SERPL-CCNC: 13 U/L (ref 10–40)
BASOPHILS # BLD AUTO: 0.06 K/UL (ref 0–0.2)
BASOPHILS NFR BLD: 0.5 % (ref 0–1.9)
BILIRUB SERPL-MCNC: 0.6 MG/DL (ref 0.1–1)
BNP SERPL-MCNC: <10 PG/ML (ref 0–99)
BUN SERPL-MCNC: 8 MG/DL (ref 6–20)
CALCIUM SERPL-MCNC: 9.5 MG/DL (ref 8.7–10.5)
CHLORIDE SERPL-SCNC: 103 MMOL/L (ref 95–110)
CO2 SERPL-SCNC: 22 MMOL/L (ref 23–29)
CREAT SERPL-MCNC: 0.8 MG/DL (ref 0.5–1.4)
DIFFERENTIAL METHOD: ABNORMAL
EOSINOPHIL # BLD AUTO: 0 K/UL (ref 0–0.5)
EOSINOPHIL NFR BLD: 0.2 % (ref 0–8)
ERYTHROCYTE [DISTWIDTH] IN BLOOD BY AUTOMATED COUNT: 13 % (ref 11.5–14.5)
EST. GFR  (NO RACE VARIABLE): >60 ML/MIN/1.73 M^2
GLUCOSE SERPL-MCNC: 369 MG/DL (ref 70–110)
HCT VFR BLD AUTO: 45.2 % (ref 37–48.5)
HGB BLD-MCNC: 15.5 G/DL (ref 12–16)
IMM GRANULOCYTES # BLD AUTO: 0.04 K/UL (ref 0–0.04)
IMM GRANULOCYTES NFR BLD AUTO: 0.3 % (ref 0–0.5)
LIPASE SERPL-CCNC: 21 U/L (ref 4–60)
LYMPHOCYTES # BLD AUTO: 1.9 K/UL (ref 1–4.8)
LYMPHOCYTES NFR BLD: 16.3 % (ref 18–48)
MCH RBC QN AUTO: 30.3 PG (ref 27–31)
MCHC RBC AUTO-ENTMCNC: 34.3 G/DL (ref 32–36)
MCV RBC AUTO: 88 FL (ref 82–98)
MONOCYTES # BLD AUTO: 0.5 K/UL (ref 0.3–1)
MONOCYTES NFR BLD: 4.2 % (ref 4–15)
NEUTROPHILS # BLD AUTO: 9.1 K/UL (ref 1.8–7.7)
NEUTROPHILS NFR BLD: 78.5 % (ref 38–73)
NRBC BLD-RTO: 0 /100 WBC
PLATELET # BLD AUTO: 332 K/UL (ref 150–450)
PMV BLD AUTO: 10 FL (ref 9.2–12.9)
POCT GLUCOSE: 226 MG/DL (ref 70–110)
POCT GLUCOSE: 326 MG/DL (ref 70–110)
POTASSIUM SERPL-SCNC: 3.4 MMOL/L (ref 3.5–5.1)
PROT SERPL-MCNC: 7.6 G/DL (ref 6–8.4)
RBC # BLD AUTO: 5.12 M/UL (ref 4–5.4)
SODIUM SERPL-SCNC: 137 MMOL/L (ref 136–145)
TROPONIN I SERPL DL<=0.01 NG/ML-MCNC: <0.006 NG/ML (ref 0–0.03)
WBC # BLD AUTO: 11.54 K/UL (ref 3.9–12.7)

## 2022-11-09 PROCEDURE — 25000003 PHARM REV CODE 250: Performed by: EMERGENCY MEDICINE

## 2022-11-09 PROCEDURE — 83880 ASSAY OF NATRIURETIC PEPTIDE: CPT | Performed by: NURSE PRACTITIONER

## 2022-11-09 PROCEDURE — 63600175 PHARM REV CODE 636 W HCPCS: Performed by: EMERGENCY MEDICINE

## 2022-11-09 PROCEDURE — 25500020 PHARM REV CODE 255: Performed by: EMERGENCY MEDICINE

## 2022-11-09 PROCEDURE — 80053 COMPREHEN METABOLIC PANEL: CPT | Performed by: NURSE PRACTITIONER

## 2022-11-09 PROCEDURE — 83690 ASSAY OF LIPASE: CPT | Performed by: NURSE PRACTITIONER

## 2022-11-09 PROCEDURE — 85025 COMPLETE CBC W/AUTO DIFF WBC: CPT | Performed by: NURSE PRACTITIONER

## 2022-11-09 PROCEDURE — 84484 ASSAY OF TROPONIN QUANT: CPT | Performed by: NURSE PRACTITIONER

## 2022-11-09 RX ORDER — ONDANSETRON 2 MG/ML
4 INJECTION INTRAMUSCULAR; INTRAVENOUS
Status: COMPLETED | OUTPATIENT
Start: 2022-11-09 | End: 2022-11-09

## 2022-11-09 RX ORDER — ONDANSETRON 4 MG/1
4 TABLET, ORALLY DISINTEGRATING ORAL EVERY 6 HOURS PRN
Qty: 20 TABLET | Refills: 0 | Status: SHIPPED | OUTPATIENT
Start: 2022-11-09

## 2022-11-09 RX ORDER — GLIPIZIDE 2.5 MG/1
5 TABLET, EXTENDED RELEASE ORAL
Qty: 30 TABLET | Refills: 0 | Status: SHIPPED | OUTPATIENT
Start: 2022-11-09

## 2022-11-09 RX ORDER — HYDROMORPHONE HYDROCHLORIDE 1 MG/ML
0.5 INJECTION, SOLUTION INTRAMUSCULAR; INTRAVENOUS; SUBCUTANEOUS
Status: COMPLETED | OUTPATIENT
Start: 2022-11-09 | End: 2022-11-09

## 2022-11-09 RX ADMIN — HYDROMORPHONE HYDROCHLORIDE 0.5 MG: 1 INJECTION, SOLUTION INTRAMUSCULAR; INTRAVENOUS; SUBCUTANEOUS at 04:11

## 2022-11-09 RX ADMIN — INSULIN HUMAN 5 UNITS: 100 INJECTION, SOLUTION PARENTERAL at 04:11

## 2022-11-09 RX ADMIN — ONDANSETRON 4 MG: 2 INJECTION, SOLUTION INTRAMUSCULAR; INTRAVENOUS at 01:11

## 2022-11-09 RX ADMIN — IOHEXOL 100 ML: 350 INJECTION, SOLUTION INTRAVENOUS at 03:11

## 2022-11-09 RX ADMIN — SODIUM CHLORIDE 1000 ML: 0.9 INJECTION, SOLUTION INTRAVENOUS at 01:11

## 2022-11-09 NOTE — ED PROVIDER NOTES
SCRIBE #1 NOTE: I, Deidre Stanton, am scribing for, and in the presence of, Yahir Valenzuela MD. I have scribed the entire note.       History     Chief Complaint   Patient presents with    Abdominal Pain     Pt c/o N/V, constipation and chest pain. Pt describes chest pain as a constant pressure since 5pm.      Review of patient's allergies indicates:   Allergen Reactions    Morphine Itching         History of Present Illness     HPI    2022, 1:45 AM  History obtained from the patient      History of Present Illness: Lani Hurst is a 52 y.o. female patient with a PMHx of anemia, heart murmur, and a mitral valve prolapse who presents to the Emergency Department for evaluation of right sided abdominal pain which onset gradually around 11:00AM yesterday. Pt c/o RUQ abdominal pain with nausea and vomiting. She is also constipated and has not had a bowel movement in 3 days. Pt states that she has episodes of nausea often and is currently out of her Zofran. Symptoms are constant and moderate in severity. No mitigating or exacerbating factors reported. Associated sxs include intermittent CP that began around 2:00PM yesterday. Patient denies any fever, chills, SOB, dysuria, weakness, and all other sxs at this time. Pt has a surgical history of a total hysterectomy and appendectomy. No further complaints or concerns at this time.       Arrival mode: Personal vehicle    PCP: Primary Doctor No        Past Medical History:  Past Medical History:   Diagnosis Date    Anemia     Heart murmur     History of blood transfusion     Mitral valve prolapse     Ovarian torsion        Past Surgical History:  Past Surgical History:   Procedure Laterality Date    APPENDECTOMY       SECTION      HYSTERECTOMY      LAPAROSCOPIC SALPINGO-OOPHORECTOMY Right 10/16/2018    Procedure: SALPINGO-OOPHORECTOMY, LAPAROSCOPIC;  Surgeon: Naty Carrero MD;  Location: [facility] OR;  Service: OB/GYN;  Laterality: Right;    LAPAROSCOPIC TOTAL  HYSTERECTOMY N/A 10/16/2018    Procedure: HYSTERECTOMY, TOTAL, LAPAROSCOPIC;  Surgeon: Naty Carrero MD;  Location: [facility] OR;  Service: OB/GYN;  Laterality: N/A;  VIDEO    Left ovary and follopian tube removal       s/p ovarian torsion         Family History:  Family History   Problem Relation Age of Onset    Prostate cancer Father     Breast cancer Neg Hx     Colon cancer Neg Hx     Ovarian cancer Neg Hx        Social History:  Social History     Tobacco Use    Smoking status: Every Day     Packs/day: 0.50     Types: Cigarettes    Smokeless tobacco: Never   Substance and Sexual Activity    Alcohol use: Yes     Comment: occasionally    Drug use: Yes     Types: Marijuana    Sexual activity: Yes     Partners: Male     Birth control/protection: None        Review of Systems     Review of Systems   Constitutional:  Negative for chills and fever.   HENT:  Negative for sore throat.    Respiratory:  Negative for shortness of breath.    Cardiovascular:  Positive for chest pain.   Gastrointestinal:  Positive for abdominal pain (RUQ), constipation (3 days), nausea and vomiting.   Genitourinary:  Negative for dysuria.   Musculoskeletal:  Negative for back pain.   Skin:  Negative for rash.   Neurological:  Negative for weakness.   Hematological:  Does not bruise/bleed easily.   All other systems reviewed and are negative.     Physical Exam     Initial Vitals [11/08/22 2157]   BP Pulse Resp Temp SpO2   (!) 150/79 78 19 98.6 °F (37 °C) 98 %      MAP       --          Physical Exam  Nursing Notes and Vital Signs Reviewed.  Constitutional: Patient is in no acute distress. Well-developed and well-nourished.  Head: Atraumatic. Normocephalic.  Eyes:  EOM intact. Conjunctivae are not pale. No scleral icterus.  ENT: Mucous membranes are moist. Oropharynx is clear and symmetric.    Neck: Supple. Full ROM.   Cardiovascular: Regular rate. Regular rhythm. No murmurs, rubs, or gallops. Distal pulses are 2+ and symmetric.  Pulmonary/Chest:  No respiratory distress. Clear to auscultation bilaterally. No wheezing or rales.  Abdominal: Soft and non-distended.  There is right sided tenderness.  No rebound, guarding, or rigidity.   Musculoskeletal: Moves all extremities. No obvious deformities. No edema.  Skin: Warm and dry.  Neurological:  Alert, awake, and appropriate.  Normal speech.  No acute focal neurological deficits are appreciated.  Psychiatric: Normal affect. Good eye contact. Appropriate in content.     ED Course   Procedures  ED Vital Signs:  Vitals:    11/08/22 2157 11/09/22 0127 11/09/22 0230 11/09/22 0315   BP: (!) 150/79  (!) 148/62 (!) 143/74   Pulse: 78 78 74 84   Resp: 19  (!) 22 20   Temp: 98.6 °F (37 °C)   99.2 °F (37.3 °C)   TempSrc: Oral   Oral   SpO2: 98%  100% 100%    11/09/22 0432   BP:    Pulse:    Resp: (!) 22   Temp:    TempSrc:    SpO2:        Abnormal Lab Results:  Labs Reviewed   CBC W/ AUTO DIFFERENTIAL - Abnormal; Notable for the following components:       Result Value    Gran # (ANC) 9.1 (*)     Gran % 78.5 (*)     Lymph % 16.3 (*)     All other components within normal limits   COMPREHENSIVE METABOLIC PANEL - Abnormal; Notable for the following components:    Potassium 3.4 (*)     CO2 22 (*)     Glucose 369 (*)     All other components within normal limits   POCT GLUCOSE - Abnormal; Notable for the following components:    POCT Glucose 333 (*)     All other components within normal limits   POCT GLUCOSE - Abnormal; Notable for the following components:    POCT Glucose 326 (*)     All other components within normal limits   TROPONIN I   B-TYPE NATRIURETIC PEPTIDE   LIPASE   POCT GLUCOSE MONITORING CONTINUOUS        All Lab Results:  Results for orders placed or performed during the hospital encounter of 11/09/22   CBC auto differential   Result Value Ref Range    WBC 11.54 3.90 - 12.70 K/uL    RBC 5.12 4.00 - 5.40 M/uL    Hemoglobin 15.5 12.0 - 16.0 g/dL    Hematocrit 45.2 37.0 - 48.5 %    MCV 88 82 - 98 fL    MCH 30.3  27.0 - 31.0 pg    MCHC 34.3 32.0 - 36.0 g/dL    RDW 13.0 11.5 - 14.5 %    Platelets 332 150 - 450 K/uL    MPV 10.0 9.2 - 12.9 fL    Immature Granulocytes 0.3 0.0 - 0.5 %    Gran # (ANC) 9.1 (H) 1.8 - 7.7 K/uL    Immature Grans (Abs) 0.04 0.00 - 0.04 K/uL    Lymph # 1.9 1.0 - 4.8 K/uL    Mono # 0.5 0.3 - 1.0 K/uL    Eos # 0.0 0.0 - 0.5 K/uL    Baso # 0.06 0.00 - 0.20 K/uL    nRBC 0 0 /100 WBC    Gran % 78.5 (H) 38.0 - 73.0 %    Lymph % 16.3 (L) 18.0 - 48.0 %    Mono % 4.2 4.0 - 15.0 %    Eosinophil % 0.2 0.0 - 8.0 %    Basophil % 0.5 0.0 - 1.9 %    Differential Method Automated    Comprehensive metabolic panel   Result Value Ref Range    Sodium 137 136 - 145 mmol/L    Potassium 3.4 (L) 3.5 - 5.1 mmol/L    Chloride 103 95 - 110 mmol/L    CO2 22 (L) 23 - 29 mmol/L    Glucose 369 (H) 70 - 110 mg/dL    BUN 8 6 - 20 mg/dL    Creatinine 0.8 0.5 - 1.4 mg/dL    Calcium 9.5 8.7 - 10.5 mg/dL    Total Protein 7.6 6.0 - 8.4 g/dL    Albumin 3.7 3.5 - 5.2 g/dL    Total Bilirubin 0.6 0.1 - 1.0 mg/dL    Alkaline Phosphatase 132 55 - 135 U/L    AST 13 10 - 40 U/L    ALT 15 10 - 44 U/L    Anion Gap 12 8 - 16 mmol/L    eGFR >60 >60 mL/min/1.73 m^2   Troponin I #1   Result Value Ref Range    Troponin I <0.006 0.000 - 0.026 ng/mL   BNP   Result Value Ref Range    BNP <10 0 - 99 pg/mL   Lipase   Result Value Ref Range    Lipase 21 4 - 60 U/L   POCT glucose   Result Value Ref Range    POCT Glucose 333 (H) 70 - 110 mg/dL   POCT glucose   Result Value Ref Range    POCT Glucose 326 (H) 70 - 110 mg/dL         Imaging Results:  Imaging Results              CT Abdomen Pelvis With Contrast (In process)  Result time 11/09/22 03:34:03                     X-Ray Chest AP Portable (In process)                 Chest x-ray reviewed interpreted by ED provider as no acute cardiopulmonary process       4:56 AM: Per STAT radiology, pt's CT abdomen and pelvis with contrast results: Mild right and transverse wall thickening which can be associated with  "inflammatory or infectious colitis      The EKG was ordered, reviewed, and independently interpreted by the ED provider.  Interpretation time: 22:24  Rate: 81 BPM  Rhythm: normal sinus rhythm  Interpretation: Possible left atrial enlargement. Cannot rule out anterior infarct. No STEMI.             The Emergency Provider reviewed the vital signs and test results, which are outlined above.     ED Discussion       5:53 AM: Reassessed pt at this time. Discussed with pt all pertinent ED information and results. Discussed pt dx and plan of tx. Gave pt all f/u and return to the ED instructions. All questions and concerns were addressed at this time. Pt expresses understanding of information and instructions, and is comfortable with plan to discharge. Pt is stable for discharge.    I discussed with patient and/or family/caretaker that evaluation in the ED does not suggest any emergent or life threatening medical conditions requiring immediate intervention beyond what was provided in the ED, and I believe patient is safe for discharge.  Regardless, an unremarkable evaluation in the ED does not preclude the development or presence of a serious of life threatening condition. As such, patient was instructed to return immediately for any worsening or change in current symptoms.         Medical Decision Making:   Clinical Tests:   Lab Tests: Ordered and Reviewed  Radiological Study: Ordered and Reviewed  Medical Tests: Ordered and Reviewed  Patient has been having recurrent right-sided abdominal pain for the past 3 months.  Associated with nausea/vomiting.  Patient has seen multiple providers ad per the patient "they told me I might have IBS or ulcerative colitis. "  She has not been given a definitive diagnosis and has not seen a gastroenterologist.  Denies history of colonoscopy.  CT scan does show right-sided colitis.  Patient states she has been tried on antibiotics without relief.  Denies any fever.  She was treated " symptomatically here in the emergency department and discussed need for follow-up with Gastroenterology.  Patient has been out of her Zofran and glipizide.  Both refilled         ED Medication(s):  Medications   sodium chloride 0.9% bolus 1,000 mL (1,000 mLs Intravenous New Bag 11/9/22 0145)   ondansetron injection 4 mg (4 mg Intravenous Given 11/9/22 0145)   iohexoL (OMNIPAQUE 350) injection 100 mL (100 mLs Intravenous Given 11/9/22 0334)   HYDROmorphone injection 0.5 mg (0.5 mg Intravenous Given 11/9/22 0432)   insulin regular injection 5 Units 0.05 mL (5 Units Intravenous Given 11/9/22 0438)       Discharge Medication List as of 11/9/2022  6:20 AM        START taking these medications    Details   glipiZIDE (GLUCOTROL) 2.5 MG TR24 Take 2 tablets (5 mg total) by mouth daily with breakfast., Starting Wed 11/9/2022, Print      ondansetron (ZOFRAN-ODT) 4 MG TbDL Take 1 tablet (4 mg total) by mouth every 6 (six) hours as needed., Starting Wed 11/9/2022, Print                     Scribe Attestation:   Scribe #1: I performed the above scribed service and the documentation accurately describes the services I performed. I attest to the accuracy of the note.     Attending:   Physician Attestation Statement for Scribe #1: I, Yahir Valenzuela MD, personally performed the services described in this documentation, as scribed by Deidre Stanton, in my presence, and it is both accurate and complete.           Clinical Impression       ICD-10-CM ICD-9-CM   1. Hyperglycemia  R73.9 790.29   2. Right sided abdominal pain  R10.9 789.09   3. Other specified diabetes mellitus without complication, without long-term current use of insulin  E13.9 250.00   4. Colitis  K52.9 558.9       Disposition:   Disposition: Discharged  Condition: Stable       Yahir Valenzuela MD  11/09/22 2006

## 2023-01-21 ENCOUNTER — HOSPITAL ENCOUNTER (EMERGENCY)
Facility: HOSPITAL | Age: 53
Discharge: HOME | End: 2023-01-21
Attending: EMERGENCY MEDICINE

## 2023-01-21 VITALS
BODY MASS INDEX: 27.72 KG/M2 | RESPIRATION RATE: 19 BRPM | DIASTOLIC BLOOD PRESSURE: 71 MMHG | WEIGHT: 154 LBS | SYSTOLIC BLOOD PRESSURE: 139 MMHG | HEART RATE: 86 BPM | TEMPERATURE: 98 F | OXYGEN SATURATION: 100 %

## 2023-01-21 DIAGNOSIS — R07.9 CHEST PAIN: ICD-10-CM

## 2023-01-21 DIAGNOSIS — R07.89 ATYPICAL CHEST PAIN: Primary | ICD-10-CM

## 2023-01-21 DIAGNOSIS — R19.8 PEPTIC ULCER SYMPTOMS: ICD-10-CM

## 2023-01-21 LAB
ALBUMIN SERPL BCP-MCNC: 3.9 G/DL (ref 3.5–5.2)
ALP SERPL-CCNC: 107 U/L (ref 55–135)
ALT SERPL W/O P-5'-P-CCNC: 14 U/L (ref 10–44)
ANION GAP SERPL CALC-SCNC: 11 MMOL/L (ref 8–16)
AST SERPL-CCNC: 14 U/L (ref 10–40)
BASOPHILS # BLD AUTO: 0.04 K/UL (ref 0–0.2)
BASOPHILS NFR BLD: 0.4 % (ref 0–1.9)
BILIRUB SERPL-MCNC: 0.4 MG/DL (ref 0.1–1)
BNP SERPL-MCNC: <10 PG/ML (ref 0–99)
BUN SERPL-MCNC: 6 MG/DL (ref 6–20)
CALCIUM SERPL-MCNC: 9.5 MG/DL (ref 8.7–10.5)
CHLORIDE SERPL-SCNC: 104 MMOL/L (ref 95–110)
CO2 SERPL-SCNC: 24 MMOL/L (ref 23–29)
CREAT SERPL-MCNC: 0.8 MG/DL (ref 0.5–1.4)
D DIMER PPP IA.FEU-MCNC: 0.21 MG/L FEU
DIFFERENTIAL METHOD: NORMAL
EOSINOPHIL # BLD AUTO: 0.3 K/UL (ref 0–0.5)
EOSINOPHIL NFR BLD: 2.7 % (ref 0–8)
ERYTHROCYTE [DISTWIDTH] IN BLOOD BY AUTOMATED COUNT: 14.4 % (ref 11.5–14.5)
EST. GFR  (NO RACE VARIABLE): >60 ML/MIN/1.73 M^2
GLUCOSE SERPL-MCNC: 285 MG/DL (ref 70–110)
HCT VFR BLD AUTO: 39.3 % (ref 37–48.5)
HGB BLD-MCNC: 13.5 G/DL (ref 12–16)
IMM GRANULOCYTES # BLD AUTO: 0.03 K/UL (ref 0–0.04)
IMM GRANULOCYTES NFR BLD AUTO: 0.3 % (ref 0–0.5)
LIPASE SERPL-CCNC: 21 U/L (ref 4–60)
LYMPHOCYTES # BLD AUTO: 3.3 K/UL (ref 1–4.8)
LYMPHOCYTES NFR BLD: 35.7 % (ref 18–48)
MCH RBC QN AUTO: 30.4 PG (ref 27–31)
MCHC RBC AUTO-ENTMCNC: 34.4 G/DL (ref 32–36)
MCV RBC AUTO: 89 FL (ref 82–98)
MONOCYTES # BLD AUTO: 0.5 K/UL (ref 0.3–1)
MONOCYTES NFR BLD: 4.9 % (ref 4–15)
NEUTROPHILS # BLD AUTO: 5.2 K/UL (ref 1.8–7.7)
NEUTROPHILS NFR BLD: 56 % (ref 38–73)
NRBC BLD-RTO: 0 /100 WBC
PLATELET # BLD AUTO: 270 K/UL (ref 150–450)
PMV BLD AUTO: 9.6 FL (ref 9.2–12.9)
POTASSIUM SERPL-SCNC: 3.9 MMOL/L (ref 3.5–5.1)
PROT SERPL-MCNC: 7.2 G/DL (ref 6–8.4)
RBC # BLD AUTO: 4.44 M/UL (ref 4–5.4)
SODIUM SERPL-SCNC: 139 MMOL/L (ref 136–145)
TROPONIN I SERPL DL<=0.01 NG/ML-MCNC: 0.01 NG/ML (ref 0–0.03)
TROPONIN I SERPL DL<=0.01 NG/ML-MCNC: 0.02 NG/ML (ref 0–0.03)
WBC # BLD AUTO: 9.32 K/UL (ref 3.9–12.7)

## 2023-01-21 PROCEDURE — 85025 COMPLETE CBC W/AUTO DIFF WBC: CPT | Performed by: NURSE PRACTITIONER

## 2023-01-21 PROCEDURE — 93010 ELECTROCARDIOGRAM REPORT: CPT | Mod: ,,, | Performed by: INTERNAL MEDICINE

## 2023-01-21 PROCEDURE — 83880 ASSAY OF NATRIURETIC PEPTIDE: CPT | Performed by: NURSE PRACTITIONER

## 2023-01-21 PROCEDURE — 85379 FIBRIN DEGRADATION QUANT: CPT | Performed by: EMERGENCY MEDICINE

## 2023-01-21 PROCEDURE — 84484 ASSAY OF TROPONIN QUANT: CPT | Mod: 91 | Performed by: EMERGENCY MEDICINE

## 2023-01-21 PROCEDURE — 96375 TX/PRO/DX INJ NEW DRUG ADDON: CPT

## 2023-01-21 PROCEDURE — 80053 COMPREHEN METABOLIC PANEL: CPT | Performed by: NURSE PRACTITIONER

## 2023-01-21 PROCEDURE — 83690 ASSAY OF LIPASE: CPT | Performed by: EMERGENCY MEDICINE

## 2023-01-21 PROCEDURE — 99285 EMERGENCY DEPT VISIT HI MDM: CPT | Mod: 25

## 2023-01-21 PROCEDURE — 96374 THER/PROPH/DIAG INJ IV PUSH: CPT

## 2023-01-21 PROCEDURE — 84484 ASSAY OF TROPONIN QUANT: CPT | Performed by: NURSE PRACTITIONER

## 2023-01-21 PROCEDURE — 96361 HYDRATE IV INFUSION ADD-ON: CPT

## 2023-01-21 PROCEDURE — 93010 EKG 12-LEAD: ICD-10-PCS | Mod: ,,, | Performed by: INTERNAL MEDICINE

## 2023-01-21 PROCEDURE — 25000003 PHARM REV CODE 250: Performed by: EMERGENCY MEDICINE

## 2023-01-21 PROCEDURE — 63600175 PHARM REV CODE 636 W HCPCS: Performed by: EMERGENCY MEDICINE

## 2023-01-21 PROCEDURE — 93005 ELECTROCARDIOGRAM TRACING: CPT

## 2023-01-21 RX ORDER — HYDROCODONE BITARTRATE AND ACETAMINOPHEN 5; 325 MG/1; MG/1
1 TABLET ORAL EVERY 8 HOURS PRN
Qty: 12 TABLET | Refills: 0 | Status: SHIPPED | OUTPATIENT
Start: 2023-01-21 | End: 2023-01-28

## 2023-01-21 RX ORDER — OMEPRAZOLE 20 MG/1
20 CAPSULE, DELAYED RELEASE ORAL DAILY
Qty: 14 CAPSULE | Refills: 0 | Status: SHIPPED | OUTPATIENT
Start: 2023-01-21 | End: 2023-02-04

## 2023-01-21 RX ORDER — HYDROMORPHONE HYDROCHLORIDE 1 MG/ML
1 INJECTION, SOLUTION INTRAMUSCULAR; INTRAVENOUS; SUBCUTANEOUS
Status: COMPLETED | OUTPATIENT
Start: 2023-01-21 | End: 2023-01-21

## 2023-01-21 RX ORDER — DIPHENHYDRAMINE HCL 25 MG
25 CAPSULE ORAL
Status: COMPLETED | OUTPATIENT
Start: 2023-01-21 | End: 2023-01-21

## 2023-01-21 RX ORDER — ONDANSETRON 2 MG/ML
4 INJECTION INTRAMUSCULAR; INTRAVENOUS
Status: COMPLETED | OUTPATIENT
Start: 2023-01-21 | End: 2023-01-21

## 2023-01-21 RX ORDER — ASPIRIN 325 MG
325 TABLET ORAL
Status: COMPLETED | OUTPATIENT
Start: 2023-01-21 | End: 2023-01-21

## 2023-01-21 RX ORDER — LIDOCAINE HYDROCHLORIDE 20 MG/ML
15 SOLUTION OROPHARYNGEAL
Status: COMPLETED | OUTPATIENT
Start: 2023-01-21 | End: 2023-01-21

## 2023-01-21 RX ORDER — MAG HYDROX/ALUMINUM HYD/SIMETH 200-200-20
30 SUSPENSION, ORAL (FINAL DOSE FORM) ORAL
Status: COMPLETED | OUTPATIENT
Start: 2023-01-21 | End: 2023-01-21

## 2023-01-21 RX ADMIN — DIPHENHYDRAMINE HYDROCHLORIDE 25 MG: 25 CAPSULE ORAL at 03:01

## 2023-01-21 RX ADMIN — ASPIRIN 325 MG ORAL TABLET 325 MG: 325 PILL ORAL at 01:01

## 2023-01-21 RX ADMIN — SODIUM CHLORIDE, POTASSIUM CHLORIDE, SODIUM LACTATE AND CALCIUM CHLORIDE 1000 ML: 600; 310; 30; 20 INJECTION, SOLUTION INTRAVENOUS at 01:01

## 2023-01-21 RX ADMIN — ONDANSETRON 4 MG: 2 INJECTION INTRAMUSCULAR; INTRAVENOUS at 02:01

## 2023-01-21 RX ADMIN — LIDOCAINE HYDROCHLORIDE 15 ML: 20 SOLUTION ORAL at 01:01

## 2023-01-21 RX ADMIN — ALUMINUM HYDROXIDE, MAGNESIUM HYDROXIDE, AND SIMETHICONE 30 ML: 200; 200; 20 SUSPENSION ORAL at 01:01

## 2023-01-21 RX ADMIN — HYDROMORPHONE HYDROCHLORIDE 1 MG: 1 INJECTION, SOLUTION INTRAMUSCULAR; INTRAVENOUS; SUBCUTANEOUS at 02:01

## 2023-01-21 NOTE — ED PROVIDER NOTES
SCRIBE #1 NOTE: I, Josy Amni, am scribing for, and in the presence of, Gavin Mejia MD. I have scribed the entire note.       History     Chief Complaint   Patient presents with    Chest Pain     Chest tightness. States she feels her heart beating and her vision is blurry. Started last night     Review of patient's allergies indicates:   Allergen Reactions    Morphine Itching         History of Present Illness     HPI    2023, 11:23 AM  History obtained from the patient      History of Present Illness: Lani Hurst is a 52 y.o. female patient with a PMHx of heart murmur, mitral valve prolapse, and anemia who presents to the Emergency Department for evaluation of intermittent CP with tingling in L arm and L leg which onset last night around 3AM. Pt states she went to bed last night but pain returned this AM. Pt also complains of epigastric and RUQ tenderness. Pt states she ate a remoulade at [employer] yesterday with horseradish. Symptoms are intermittent and moderate in severity. No mitigating or exacerbating factors reported.No further complaints or concerns at this time.       Arrival mode: Personal vehicle      PCP: Primary Doctor No        Past Medical History:  Past Medical History:   Diagnosis Date    Anemia     Heart murmur     History of blood transfusion     Mitral valve prolapse     Ovarian torsion        Past Surgical History:  Past Surgical History:   Procedure Laterality Date    APPENDECTOMY       SECTION      HYSTERECTOMY      LAPAROSCOPIC SALPINGO-OOPHORECTOMY Right 10/16/2018    Procedure: SALPINGO-OOPHORECTOMY, LAPAROSCOPIC;  Surgeon: Naty Carrero MD;  Location: [facility] OR;  Service: OB/GYN;  Laterality: Right;    LAPAROSCOPIC TOTAL HYSTERECTOMY N/A 10/16/2018    Procedure: HYSTERECTOMY, TOTAL, LAPAROSCOPIC;  Surgeon: Naty Carrero MD;  Location: [facility] OR;  Service: OB/GYN;  Laterality: N/A;  VIDEO    Left ovary and follopian tube removal       s/p ovarian torsion          Family History:  Family History   Problem Relation Age of Onset    Prostate cancer Father     Breast cancer Neg Hx     Colon cancer Neg Hx     Ovarian cancer Neg Hx        Social History:  Social History     Tobacco Use    Smoking status: Every Day     Packs/day: 0.50     Types: Cigarettes    Smokeless tobacco: Never   Substance and Sexual Activity    Alcohol use: Yes     Comment: occasionally    Drug use: Yes     Types: Marijuana    Sexual activity: Yes     Partners: Male     Birth control/protection: None        Review of Systems     Review of Systems   Constitutional:  Negative for fever.   HENT:  Negative for sore throat.    Eyes:  Positive for visual disturbance.   Respiratory:  Negative for chest tightness and shortness of breath.    Cardiovascular:  Positive for chest pain.   Gastrointestinal:  Positive for abdominal pain. Negative for nausea and vomiting.   Genitourinary:  Negative for dysuria and frequency.   Musculoskeletal:  Negative for back pain.   Skin:  Negative for rash.   Neurological:  Negative for weakness.   Hematological:  Does not bruise/bleed easily.   All other systems reviewed and are negative.   Physical Exam     Initial Vitals [01/21/23 1043]   BP Pulse Resp Temp SpO2   136/79 109 18 98.2 °F (36.8 °C) 99 %      MAP       --          Physical Exam  Nursing Notes and Vital Signs reviewed.    Constitutional: Patient is in mild to moderate distress.   Head: Normocephalic. Atraumatic.   Eyes:  Conjunctivae are not pale. No scleral icterus.   ENT: Mucous membranes moist.   Neck: Supple.   Cardiovascular: Regular rate. Regular rhythm. No murmurs, gallops, or rubs.   Pulmonary: No respiratory distress. Clear to ausculation bilaterally.  Abdominal: Non-distended. RUQ tenderness. Epigastric tenderness.  Musculoskeletal: Moves all extremities. No obvious deformities.   Skin: Warm and dry.   Neurological:  Alert, awake, and appropriate. Normal speech. No acute lateralizing neurologic deficits  appreciated.   Psychiatric: Normal affect.      ED Course   Procedures  ED Vital Signs:  Vitals:    01/21/23 1043 01/21/23 1115 01/21/23 1145 01/21/23 1300   BP: 136/79 (!) 150/90 (!) 142/81    Pulse: 109 90 82 80   Resp: 18 18 16 18   Temp: 98.2 °F (36.8 °C)      TempSrc: Oral      SpO2: 99% 100% 100% 100%   Weight: 69.8 kg (153 lb 15.9 oz)       01/21/23 1315 01/21/23 1400 01/21/23 1430 01/21/23 1515   BP: 136/77 (!) 149/72  138/88   Pulse: 76 78  85   Resp: 20 20 18 20   Temp:       TempSrc:       SpO2: 100% 100%  100%   Weight:        01/21/23 1630 01/21/23 1700 01/21/23 1800 01/21/23 1837   BP: (!) 142/78 139/81 139/71    Pulse: 74 79 81 86   Resp: 17 18 19    Temp:       TempSrc:       SpO2: 99% 100% 99% 100%   Weight:           Abnormal Lab Results:  Labs Reviewed   COMPREHENSIVE METABOLIC PANEL - Abnormal; Notable for the following components:       Result Value    Glucose 285 (*)     All other components within normal limits   CBC W/ AUTO DIFFERENTIAL   TROPONIN I   B-TYPE NATRIURETIC PEPTIDE   LIPASE   D DIMER, QUANTITATIVE   TROPONIN I        All Lab Results:  Results for orders placed or performed during the hospital encounter of 01/21/23   CBC auto differential   Result Value Ref Range    WBC 9.32 3.90 - 12.70 K/uL    RBC 4.44 4.00 - 5.40 M/uL    Hemoglobin 13.5 12.0 - 16.0 g/dL    Hematocrit 39.3 37.0 - 48.5 %    MCV 89 82 - 98 fL    MCH 30.4 27.0 - 31.0 pg    MCHC 34.4 32.0 - 36.0 g/dL    RDW 14.4 11.5 - 14.5 %    Platelets 270 150 - 450 K/uL    MPV 9.6 9.2 - 12.9 fL    Immature Granulocytes 0.3 0.0 - 0.5 %    Gran # (ANC) 5.2 1.8 - 7.7 K/uL    Immature Grans (Abs) 0.03 0.00 - 0.04 K/uL    Lymph # 3.3 1.0 - 4.8 K/uL    Mono # 0.5 0.3 - 1.0 K/uL    Eos # 0.3 0.0 - 0.5 K/uL    Baso # 0.04 0.00 - 0.20 K/uL    nRBC 0 0 /100 WBC    Gran % 56.0 38.0 - 73.0 %    Lymph % 35.7 18.0 - 48.0 %    Mono % 4.9 4.0 - 15.0 %    Eosinophil % 2.7 0.0 - 8.0 %    Basophil % 0.4 0.0 - 1.9 %    Differential Method Automated     Comprehensive metabolic panel   Result Value Ref Range    Sodium 139 136 - 145 mmol/L    Potassium 3.9 3.5 - 5.1 mmol/L    Chloride 104 95 - 110 mmol/L    CO2 24 23 - 29 mmol/L    Glucose 285 (H) 70 - 110 mg/dL    BUN 6 6 - 20 mg/dL    Creatinine 0.8 0.5 - 1.4 mg/dL    Calcium 9.5 8.7 - 10.5 mg/dL    Total Protein 7.2 6.0 - 8.4 g/dL    Albumin 3.9 3.5 - 5.2 g/dL    Total Bilirubin 0.4 0.1 - 1.0 mg/dL    Alkaline Phosphatase 107 55 - 135 U/L    AST 14 10 - 40 U/L    ALT 14 10 - 44 U/L    Anion Gap 11 8 - 16 mmol/L    eGFR >60 >60 mL/min/1.73 m^2   Troponin I #1   Result Value Ref Range    Troponin I 0.018 0.000 - 0.026 ng/mL   BNP   Result Value Ref Range    BNP <10 0 - 99 pg/mL   Lipase   Result Value Ref Range    Lipase 21 4 - 60 U/L   D dimer, quantitative   Result Value Ref Range    D-Dimer 0.21 <0.50 mg/L FEU   Troponin I   Result Value Ref Range    Troponin I 0.007 0.000 - 0.026 ng/mL         Imaging Results:  Imaging Results              US Abdomen Limited (Final result)  Result time 01/21/23 12:20:45      Final result by Everette Meléndez MD (01/21/23 12:20:45)                   Impression:      Contracted gallbladder.  Exam otherwise unremarkable.      Electronically signed by: Everette Meléndez  Date:    01/21/2023  Time:    12:20               Narrative:    EXAMINATION:  US ABDOMEN LIMITED    CLINICAL HISTORY:  ruq pain;    FINDINGS:  The liver is borderline in size but otherwise unremarkable.  The gallbladder is contracted.  The common bile duct is normal at 4 mm.  Visualized pancreas and right kidney appear normal.  No free fluid.  A paddle pedal flow in the portal vein.                                       X-Ray Chest AP Portable (Final result)  Result time 01/21/23 11:16:07      Final result by Everette Meléndez MD (01/21/23 11:16:07)                   Impression:      No acute findings.      Electronically signed by: Everette Meléndez  Date:    01/21/2023  Time:    11:16               Narrative:    EXAMINATION:  XR  CHEST AP PORTABLE    CLINICAL HISTORY:  Chest Pain;    COMPARISON:  11/19/2022    FINDINGS:  Lung fields are clear.  Heart size is normal.    No pleural fluid or pneumothorax.    No adenopathy is identified.    Thoracolumbar scoliosis..                                       The EKG was ordered, reviewed, and independently interpreted by the ED provider.  Interpretation time: 10:51  Rate: 89 BPM  Rhythm: normal sinus rhythm  Interpretation: Cannot rule out Anterior infarct. No STEMI.           The Emergency Provider reviewed the vital signs and test results, which are outlined above.     ED Discussion     6:20 PM: Reassessed pt at this time.  Discussed with pt all pertinent ED information and results. Discussed pt dx and plan of tx. Gave pt all f/u and return to the ED instructions. All questions and concerns were addressed at this time. Pt expresses understanding of information and instructions, and is comfortable with plan to discharge. Pt is stable for discharge.    I discussed with patient and/or family/caretaker that evaluation in the ED does not suggest any emergent or life threatening medical conditions requiring immediate intervention beyond what was provided in the ED, and I believe patient is safe for discharge.  Regardless, an unremarkable evaluation in the ED does not preclude the development or presence of a serious of life threatening condition. As such, patient was instructed to return immediately for any worsening or change in current symptoms.         Medical Decision Making:   History:   Old Records Summarized: records from clinic visits.  Clinical Tests:   Lab Tests: Ordered and Reviewed  Radiological Study: Ordered and Reviewed  Medical Tests: Ordered and Reviewed         ED Medication(s):  Medications   aspirin tablet 325 mg (325 mg Oral Given 1/21/23 1321)   aluminum-magnesium hydroxide-simethicone 200-200-20 mg/5 mL suspension 30 mL (30 mLs Oral Given 1/21/23 1321)     And   LIDOcaine HCl 2%  oral solution 15 mL (15 mLs Oral Given 1/21/23 1321)   lactated ringers bolus 1,000 mL (0 mLs Intravenous Stopped 1/21/23 1423)   HYDROmorphone injection 1 mg (1 mg Intravenous Given 1/21/23 1430)   ondansetron injection 4 mg (4 mg Intravenous Given 1/21/23 1430)   diphenhydrAMINE capsule 25 mg (25 mg Oral Given 1/21/23 1555)       Discharge Medication List as of 1/21/2023  6:29 PM        START taking these medications    Details   HYDROcodone-acetaminophen (NORCO) 5-325 mg per tablet Take 1 tablet by mouth every 8 (eight) hours as needed for Pain., Starting Sat 1/21/2023, Until Sat 1/28/2023 at 2029, Print      omeprazole (PRILOSEC) 20 MG capsule Take 1 capsule (20 mg total) by mouth once daily. for 14 days, Starting Sat 1/21/2023, Until Sat 2/4/2023, Print              Follow-up Information       Schedule an appointment as soon as possible for a visit  with Sebas Obregon MD.    Specialties: Interventional Cardiology, Cardiology  Why: if no improvement of chest pain  Contact information:  [address]  [place] [address] [phone]               Schedule an appointment as soon as possible for a visit  with with your PCP.               [facility] - Emergency Dept..    Specialty: Emergency Medicine  Why: As needed, If symptoms worsen  Contact information:  [address] [facility] [address] [phone]                               Scribe Attestation:   Scribe #1: I performed the above scribed service and the documentation accurately describes the services I performed. I attest to the accuracy of the note.     Attending:   Physician Attestation Statement for Scribe #1: I, Gavin Mejia MD, personally performed the services described in this documentation, as scribed by Josy Amin, in my presence, and it is both accurate and complete.           Clinical Impression       ICD-10-CM ICD-9-CM   1. Atypical chest pain  R07.89 786.59   2. Chest pain  R07.9 786.50   3. Peptic  ulcer symptoms  R19.8 787.99       Disposition:   Disposition: Discharged  Condition: Stable'     Gavin Mejia MD  01/23/23 1013

## 2023-01-21 NOTE — FIRST PROVIDER EVALUATION
Medical screening examination initiated.  I have conducted a focused provider triage encounter, findings are as follows:    Brief history of present illness:  reports chest pain and palpations     There were no vitals filed for this visit.    Pertinent physical exam:  nad    Brief workup plan:  labs, ekg, imaging     Preliminary workup initiated; this workup will be continued and followed by the physician or advanced practice provider that is assigned to the patient when roomed.

## (undated) DEVICE — PACK BASIC

## (undated) DEVICE — PAD PERI POST REPLACEMNT

## (undated) DEVICE — TRAY FOLEY 16FR INFECTION CONT

## (undated) DEVICE — ENDOSTITCH INSTRUMENT

## (undated) DEVICE — DRAPE LAVH LAPAROSCOPY W/FLUID

## (undated) DEVICE — SYR 10CC LUER LOCK

## (undated) DEVICE — ELECTRODE REM PLYHSV RETURN 9

## (undated) DEVICE — APPLICATOR CHLORAPREP ORN 26ML

## (undated) DEVICE — SEE MEDLINE ITEM 157116

## (undated) DEVICE — BLADE SURG CARBON STEEL SZ11

## (undated) DEVICE — SEE MEDLINE ITEM 146355

## (undated) DEVICE — PAD PREP 50/CA

## (undated) DEVICE — OCCLUDER COLPO-PNEUMO STERILE

## (undated) DEVICE — TIP RUMI GREEN DISPOSABLE6.7MM

## (undated) DEVICE — NDL INSUF ULTRA VERESS 120MM

## (undated) DEVICE — UNDERGLOVES BIOGEL PI SZ 7 LF

## (undated) DEVICE — SYS CLSR DERMABOND PRINEO 22CM

## (undated) DEVICE — SYR 50CC LL

## (undated) DEVICE — Device

## (undated) DEVICE — SEE MEDLINE ITEM 157131

## (undated) DEVICE — TUBING INSUFFLATION 10

## (undated) DEVICE — ENDOSTITCH POLYSORB 0 ES-9

## (undated) DEVICE — SEE MEDLINE ITEM 152622

## (undated) DEVICE — COVER OVERHEAD SURG LT BLUE

## (undated) DEVICE — SEE MEDLINE ITEM 157117

## (undated) DEVICE — SEE MEDLINE ITEM 154981

## (undated) DEVICE — HEMOSTAT SURGICEL 4X8IN

## (undated) DEVICE — CLOSURE SKIN STERI STRIP 1/2X4

## (undated) DEVICE — SUT MONOCRYL 4-0 PS-2

## (undated) DEVICE — MANIFOLD 4 PORT

## (undated) DEVICE — SEE MEDLINE ITEM 156955

## (undated) DEVICE — IRRIGATOR ENDOSCOPY DISP.

## (undated) DEVICE — DISSECTOR EPIX LAPA 5MMX35CM

## (undated) DEVICE — SCISSOR 5MMX35CM DIRECT DRIVE

## (undated) DEVICE — KIT ANTIFOG

## (undated) DEVICE — ADHESIVE DERMABOND ADVANCED

## (undated) DEVICE — SUT VICRYL PLUS 2-0 CT1 18

## (undated) DEVICE — GLOVE SURGICAL LATEX SZ 6.5

## (undated) DEVICE — TROCAR ENDOPATH XCEL 5MM 7.5CM

## (undated) DEVICE — SUT 0 VICRYL / CT-1

## (undated) DEVICE — DRESSING ANTIMICROBIAL 1 INCH